# Patient Record
Sex: MALE | Race: WHITE | Employment: OTHER | URBAN - METROPOLITAN AREA
[De-identification: names, ages, dates, MRNs, and addresses within clinical notes are randomized per-mention and may not be internally consistent; named-entity substitution may affect disease eponyms.]

---

## 2017-03-27 PROBLEM — E66.9 OBESITY (BMI 30-39.9): Status: ACTIVE | Noted: 2017-03-27

## 2017-06-12 ENCOUNTER — HOSPITAL ENCOUNTER (OUTPATIENT)
Dept: SURGERY | Age: 77
Discharge: HOME | End: 2017-06-12
Payer: MEDICARE

## 2017-06-12 VITALS
WEIGHT: 304.38 LBS | OXYGEN SATURATION: 98 % | SYSTOLIC BLOOD PRESSURE: 100 MMHG | TEMPERATURE: 97.6 F | HEART RATE: 91 BPM | BODY MASS INDEX: 37.06 KG/M2 | DIASTOLIC BLOOD PRESSURE: 68 MMHG | HEIGHT: 76 IN | RESPIRATION RATE: 18 BRPM

## 2017-06-12 LAB
ANION GAP BLD CALC-SCNC: 6 MMOL/L (ref 7–16)
APPEARANCE UR: CLEAR
BACTERIA SPEC CULT: NORMAL
BILIRUB UR QL: NEGATIVE
BUN SERPL-MCNC: 30 MG/DL (ref 8–23)
CALCIUM SERPL-MCNC: 9.5 MG/DL (ref 8.3–10.4)
CHLORIDE SERPL-SCNC: 107 MMOL/L (ref 98–107)
CO2 SERPL-SCNC: 30 MMOL/L (ref 21–32)
COLOR UR: YELLOW
CREAT SERPL-MCNC: 1.58 MG/DL (ref 0.8–1.5)
ERYTHROCYTE [DISTWIDTH] IN BLOOD BY AUTOMATED COUNT: 15.3 % (ref 11.9–14.6)
GLUCOSE SERPL-MCNC: 111 MG/DL (ref 65–100)
GLUCOSE UR STRIP.AUTO-MCNC: NEGATIVE MG/DL
HCT VFR BLD AUTO: 40.6 % (ref 41.1–50.3)
HGB BLD-MCNC: 13.3 G/DL (ref 13.6–17.2)
HGB UR QL STRIP: NEGATIVE
KETONES UR QL STRIP.AUTO: NEGATIVE MG/DL
LEUKOCYTE ESTERASE UR QL STRIP.AUTO: NEGATIVE
MCH RBC QN AUTO: 28.9 PG (ref 26.1–32.9)
MCHC RBC AUTO-ENTMCNC: 32.8 G/DL (ref 31.4–35)
MCV RBC AUTO: 88.3 FL (ref 79.6–97.8)
NITRITE UR QL STRIP.AUTO: NEGATIVE
PH UR STRIP: 5 [PH] (ref 5–9)
PLATELET # BLD AUTO: 252 K/UL (ref 150–450)
PMV BLD AUTO: 11 FL (ref 10.8–14.1)
POTASSIUM SERPL-SCNC: 4.9 MMOL/L (ref 3.5–5.1)
PROT UR STRIP-MCNC: NEGATIVE MG/DL
RBC # BLD AUTO: 4.6 M/UL (ref 4.23–5.67)
SERVICE CMNT-IMP: NORMAL
SODIUM SERPL-SCNC: 143 MMOL/L (ref 136–145)
SP GR UR REFRACTOMETRY: 1.02 (ref 1–1.02)
UROBILINOGEN UR QL STRIP.AUTO: 0.2 EU/DL (ref 0.2–1)
WBC # BLD AUTO: 8.3 K/UL (ref 4.3–11.1)

## 2017-06-12 PROCEDURE — 93005 ELECTROCARDIOGRAM TRACING: CPT | Performed by: ANESTHESIOLOGY

## 2017-06-12 PROCEDURE — 85027 COMPLETE CBC AUTOMATED: CPT | Performed by: ANESTHESIOLOGY

## 2017-06-12 PROCEDURE — 80048 BASIC METABOLIC PNL TOTAL CA: CPT | Performed by: ANESTHESIOLOGY

## 2017-06-12 PROCEDURE — 87641 MR-STAPH DNA AMP PROBE: CPT | Performed by: ANESTHESIOLOGY

## 2017-06-12 PROCEDURE — 81003 URINALYSIS AUTO W/O SCOPE: CPT | Performed by: ANESTHESIOLOGY

## 2017-06-12 NOTE — PERIOP NOTES
Patient verified name, , and surgery as listed in [facility]. TYPE  CASE:3  Orders per surgeon: ,not Received  Labs per surgeon:CBC,BMP,UA, Mrsa  : results pending  Labs per anesthesia protocol: t/S DOS : results pending  EKG  :  17    Patient provided with handouts including guide to surgery , transfusions, pain management and hand hygiene for the family and community. Pt verbalizes understanding of all pre-op instructions . Instructed that family must be present in building at all times. Nothing to eat or drink after midnight the night prior to surgery. Hibiclens and instructions given per hospital policy. Instructed patient to continue  previous medications as prescribed prior to surgery and  to take the following medications the day of surgery according to anesthesia guidelines :Allopurinol, ASA 81 mg, Finasteride, gabapentin, Tamsulosin          Original medication prescription bottles not visualized during patient appointment. Continue all previous medications unless otherwise directed. Instructed patient to hold  the following medications prior to surgery: Vitamion D, meloxicam, Multivitamin, Fish Oil. Patient verbalized understanding of all instructions and provided all medical/health information to the best of their ability.

## 2017-06-12 NOTE — PERIOP NOTES
Recent Results (from the past 12 hour(s))   CBC W/O DIFF    Collection Time: 06/12/17 10:45 AM   Result Value Ref Range    WBC 8.3 4.3 - 11.1 K/uL    RBC 4.60 4.23 - 5.67 M/uL    HGB 13.3 (L) 13.6 - 17.2 g/dL    HCT 40.6 (L) 41.1 - 50.3 %    MCV 88.3 79.6 - 97.8 FL    MCH 28.9 26.1 - 32.9 PG    MCHC 32.8 31.4 - 35.0 g/dL    RDW 15.3 (H) 11.9 - 14.6 %    PLATELET 542 631 - 968 K/uL    MPV 11.0 10.8 - 69.9 FL   METABOLIC PANEL, BASIC    Collection Time: 06/12/17 10:45 AM   Result Value Ref Range    Sodium 143 136 - 145 mmol/L    Potassium 4.9 3.5 - 5.1 mmol/L    Chloride 107 98 - 107 mmol/L    CO2 30 21 - 32 mmol/L    Anion gap 6 (L) 7 - 16 mmol/L    Glucose 111 (H) 65 - 100 mg/dL    BUN 30 (H) 8 - 23 MG/DL    Creatinine 1.58 (H) 0.8 - 1.5 MG/DL    GFR est AA 55 (L) >60 ml/min/1.73m2    GFR est non-AA 45 (L) >60 ml/min/1.73m2    Calcium 9.5 8.3 - 10.4 MG/DL   URINALYSIS W/ RFLX MICROSCOPIC    Collection Time: 06/12/17 11:00 AM   Result Value Ref Range    Color YELLOW      Appearance CLEAR      Specific gravity 1.020 1.001 - 1.023      pH (UA) 5.0 5.0 - 9.0      Protein NEGATIVE  NEG mg/dL    Glucose NEGATIVE  mg/dL    Ketone NEGATIVE  NEG mg/dL    Bilirubin NEGATIVE  NEG      Blood NEGATIVE  NEG      Urobilinogen 0.2 0.2 - 1.0 EU/dL    Nitrites NEGATIVE  NEG      Leukocyte Esterase NEGATIVE  NEG     MSSA/MRSA SC BY PCR, NASAL SWAB    Collection Time: 06/12/17 11:00 AM   Result Value Ref Range    Special Requests: NO SPECIAL REQUESTS      Culture result:        SA target not detected. A MRSA NEGATIVE, SA NEGATIVE test result does not preclude MRSA or SA nasal colonization.    Reviewed

## 2017-06-13 LAB
ATRIAL RATE: 83 BPM
CALCULATED P AXIS, ECG09: 37 DEGREES
CALCULATED R AXIS, ECG10: 0 DEGREES
CALCULATED T AXIS, ECG11: 24 DEGREES
DIAGNOSIS, 93000: NORMAL
P-R INTERVAL, ECG05: 184 MS
Q-T INTERVAL, ECG07: 370 MS
QRS DURATION, ECG06: 102 MS
QTC CALCULATION (BEZET), ECG08: 434 MS
VENTRICULAR RATE, ECG03: 83 BPM

## 2017-06-19 ENCOUNTER — ANESTHESIA EVENT (OUTPATIENT)
Dept: SURGERY | Age: 77
DRG: 460 | End: 2017-06-19
Payer: MEDICARE

## 2017-06-19 NOTE — H&P
Allergies:NKDA  Medications:Allopurinol (300 MG); Aspirin; Atorvastatin Calcium (10 MG);CoQ10; Finasteride (5 MG); Fish Oil;Gabapentin (600 MG); Lisinopril (10 MG);Meloxicam (7.5 MG); OxyCODONE HCl (5 MG, Take 1 tablet(s) by mouth every 4-6 hours as needed [PRN]);Tamsulosin HCl (0.4 MG)    CC: LOW BACK AND LEG PAIN    HX:  He is a 76-YO gentleman who has had low back pain and left leg pain posterolateral, but really just to the knee, and he has claudication symptoms. He used to be very active and played basketball and walked for exercise, but now with the claudication symptoms progressively worsening he cant be on his feet for any length of time. He has to sit down. I went through his medical history and he is really pretty healthy. 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 He does not have any angina symptoms. He has saw Dr. Eloisa Coleman a year ago and was doing fine. He did see Dr. Alexa De Leon about 2 years ago who did not recommend surgery. He is tired of hurting and having to take medications. The only blood thinner he takes is ASA and Fish Oil. He has no other significant medical problems. He is a large gentleman. He is 64 and weighs 340 lbs. He has previous x-rays from  May 2017 and they show he is straight on the AP view and has good lordosis lateral view. He has a lot of degenerative changes. At L5-S1 he has a spondylolisthesis, but he has large anterior spurs that look like they have autofused. At L4-5 he has a spondylolisthesis; L3-4 no listhesis, but he has spinal stenosis at L3-4 and L4-5 and also a fairly large right sided disc herniation at L3-4. He has a little bit of stenosis at 2-3 above. He really does not have any significant stenosis at 5-1 and even the foramen look pretty free. He has had injections that worked well, but just temporary. He is looking for a surgical solution to increase his activity.  Based on the scan and x-rays and I printed pictures and showed them to him and discussed it. I think he will need L3 to 5 laminectomy and fusion. I think we can ignore the other levels. I think this will makes him a lot better, but not necessarily 100% better. I explained the procedure in detail including the TLIFs and went through the hospital stay average about 3 days, possible rehab, having to wear a brace for 6 weeks, gradually get him back to activities and usually by 3 months patient is doing well. I went through the risks including  death, infection, paralysis, heart attack, stroke, bleeding, transfusion, clot in leg, clot in lung, dural tear, failure of fusion and/or instrumentation and the fact that I  cannot guarantee pain relief. He understands. I answered his questions and he would like to proceed. EXAM:  He is a tall, stocky, but obese gentleman with no gross deformities. He is A & O x 3. HEENT:  PERRLA. Oropharynx is clear. Neck is without adenopathy or bruits. Lungs are clear to auscultation bilaterally. Heart is RRR without murmur. Abdomen is obese, soft and nontender. PLAN:  L3 to 5  laminectomy, fusion TLIF.       Electronically Signed By Jmaa DELEON/antonia

## 2017-06-20 ENCOUNTER — HOSPITAL ENCOUNTER (INPATIENT)
Age: 77
LOS: 3 days | Discharge: HOME | DRG: 460 | End: 2017-06-23
Attending: ORTHOPAEDIC SURGERY | Admitting: ORTHOPAEDIC SURGERY
Payer: MEDICARE

## 2017-06-20 ENCOUNTER — ANESTHESIA (OUTPATIENT)
Dept: SURGERY | Age: 77
DRG: 460 | End: 2017-06-20
Payer: MEDICARE

## 2017-06-20 ENCOUNTER — APPOINTMENT (OUTPATIENT)
Dept: GENERAL RADIOLOGY | Age: 77
DRG: 460 | End: 2017-06-20
Attending: ORTHOPAEDIC SURGERY
Payer: MEDICARE

## 2017-06-20 PROBLEM — M48.062 SPINAL STENOSIS, LUMBAR REGION, WITH NEUROGENIC CLAUDICATION: Status: ACTIVE | Noted: 2017-06-20

## 2017-06-20 PROBLEM — M43.16 SPONDYLOLISTHESIS OF LUMBAR REGION: Status: ACTIVE | Noted: 2017-06-20

## 2017-06-20 LAB
ABO + RH BLD: NORMAL
BLOOD GROUP ANTIBODIES SERPL: NORMAL
SPECIMEN EXP DATE BLD: NORMAL

## 2017-06-20 PROCEDURE — 74011000250 HC RX REV CODE- 250

## 2017-06-20 PROCEDURE — 77030033269 HC SLV COMPR SCD KNE2 CARD -B: Performed by: ORTHOPAEDIC SURGERY

## 2017-06-20 PROCEDURE — 77030008703 HC TU ET UNCUF COVD -A: Performed by: ANESTHESIOLOGY

## 2017-06-20 PROCEDURE — 77030037710: Performed by: ORTHOPAEDIC SURGERY

## 2017-06-20 PROCEDURE — 65270000029 HC RM PRIVATE

## 2017-06-20 PROCEDURE — 77030034849: Performed by: ORTHOPAEDIC SURGERY

## 2017-06-20 PROCEDURE — 74011250636 HC RX REV CODE- 250/636: Performed by: ORTHOPAEDIC SURGERY

## 2017-06-20 PROCEDURE — C1713 ANCHOR/SCREW BN/BN,TIS/BN: HCPCS | Performed by: ORTHOPAEDIC SURGERY

## 2017-06-20 PROCEDURE — 77030037158 HC BIT DRL SPN XIA DISP STRY -C: Performed by: ORTHOPAEDIC SURGERY

## 2017-06-20 PROCEDURE — 0SG30Z1 FUSION OF LUMBOSACRAL JOINT, POST APPR P COL, OPEN APPROACH: ICD-10-PCS | Performed by: ORTHOPAEDIC SURGERY

## 2017-06-20 PROCEDURE — 0SG00AJ FUSION OF LUMBAR VERTEBRAL JOINT WITH INTERBODY FUSION DEVICE, POSTERIOR APPROACH, ANTERIOR COLUMN, OPEN APPROACH: ICD-10-PCS | Performed by: ORTHOPAEDIC SURGERY

## 2017-06-20 PROCEDURE — 86900 BLOOD TYPING SEROLOGIC ABO: CPT | Performed by: ANESTHESIOLOGY

## 2017-06-20 PROCEDURE — 76210000020 HC REC RM PH II FIRST 0.5 HR: Performed by: ORTHOPAEDIC SURGERY

## 2017-06-20 PROCEDURE — 77030014007 HC SPNG HEMSTAT J&J -B: Performed by: ORTHOPAEDIC SURGERY

## 2017-06-20 PROCEDURE — P9045 ALBUMIN (HUMAN), 5%, 250 ML: HCPCS

## 2017-06-20 PROCEDURE — 77030019908 HC STETH ESOPH SIMS -A: Performed by: ANESTHESIOLOGY

## 2017-06-20 PROCEDURE — 77030014368: Performed by: ORTHOPAEDIC SURGERY

## 2017-06-20 PROCEDURE — 77030008477 HC STYL SATN SLP COVD -A: Performed by: ANESTHESIOLOGY

## 2017-06-20 PROCEDURE — 77030012894: Performed by: ORTHOPAEDIC SURGERY

## 2017-06-20 PROCEDURE — 77030014647 HC SEAL FBRN TISSL BAXT -D: Performed by: ORTHOPAEDIC SURGERY

## 2017-06-20 PROCEDURE — 77030018836 HC SOL IRR NACL ICUM -A: Performed by: ORTHOPAEDIC SURGERY

## 2017-06-20 PROCEDURE — 77030019557 HC ELECTRD VES SEAL MEDT -F: Performed by: ORTHOPAEDIC SURGERY

## 2017-06-20 PROCEDURE — 77030037145 HC XCONN SPN POLYX XIA STRY -G: Performed by: ORTHOPAEDIC SURGERY

## 2017-06-20 PROCEDURE — 77030025623 HC BUR RND PRECIS STRY -D: Performed by: ORTHOPAEDIC SURGERY

## 2017-06-20 PROCEDURE — 0SG00Z1 FUSION OF LUM JT, POST APPR P COL, OPEN APPROACH: ICD-10-PCS | Performed by: ORTHOPAEDIC SURGERY

## 2017-06-20 PROCEDURE — 0SG30AJ FUSION OF LUMBOSACRAL JOINT WITH INTERBODY FUSION DEVICE, POSTERIOR APPROACH, ANTERIOR COLUMN, OPEN APPROACH: ICD-10-PCS | Performed by: ORTHOPAEDIC SURGERY

## 2017-06-20 PROCEDURE — 77030000001 HC SPCR SPN PEEK STRY -I2: Performed by: ORTHOPAEDIC SURGERY

## 2017-06-20 PROCEDURE — 77030031139 HC SUT VCRL2 J&J -A: Performed by: ORTHOPAEDIC SURGERY

## 2017-06-20 PROCEDURE — 76060000043 HC ANESTHESIA 6 TO 6.5 HR: Performed by: ORTHOPAEDIC SURGERY

## 2017-06-20 PROCEDURE — 77030020782 HC GWN BAIR PAWS FLX 3M -B: Performed by: ANESTHESIOLOGY

## 2017-06-20 PROCEDURE — 77030034760 HC NDL BN MAR ASPIR JAMSH STRY -B: Performed by: ORTHOPAEDIC SURGERY

## 2017-06-20 PROCEDURE — 74011250636 HC RX REV CODE- 250/636

## 2017-06-20 PROCEDURE — 77030031359 HC BLD BN MILL DISP STRY -E: Performed by: ORTHOPAEDIC SURGERY

## 2017-06-20 PROCEDURE — 77030019940 HC BLNKT HYPOTHRM STRY -B: Performed by: ANESTHESIOLOGY

## 2017-06-20 PROCEDURE — 079T3ZZ DRAINAGE OF BONE MARROW, PERCUTANEOUS APPROACH: ICD-10-PCS | Performed by: ORTHOPAEDIC SURGERY

## 2017-06-20 PROCEDURE — 76010000179 HC OR TIME 6 TO 6.5 HR INTENSV-TIER 1: Performed by: ORTHOPAEDIC SURGERY

## 2017-06-20 PROCEDURE — 88304 TISSUE EXAM BY PATHOLOGIST: CPT | Performed by: ORTHOPAEDIC SURGERY

## 2017-06-20 PROCEDURE — 74011250636 HC RX REV CODE- 250/636: Performed by: ANESTHESIOLOGY

## 2017-06-20 PROCEDURE — 77030011640 HC PAD GRND REM COVD -A: Performed by: ORTHOPAEDIC SURGERY

## 2017-06-20 PROCEDURE — 77030037155 HC BLCKR SPN CAP LOK AXI STRY -B: Performed by: ORTHOPAEDIC SURGERY

## 2017-06-20 PROCEDURE — 74011000272 HC RX REV CODE- 272: Performed by: ORTHOPAEDIC SURGERY

## 2017-06-20 PROCEDURE — 76210000017 HC OR PH I REC 1.5 TO 2 HR: Performed by: ORTHOPAEDIC SURGERY

## 2017-06-20 PROCEDURE — 77030030163 HC BN WAX J&J -A: Performed by: ORTHOPAEDIC SURGERY

## 2017-06-20 PROCEDURE — 77030037143 HC ROD SPN HEX XIA TI STRY -D: Performed by: ORTHOPAEDIC SURGERY

## 2017-06-20 PROCEDURE — 00BT0ZZ EXCISION OF SPINAL MENINGES, OPEN APPROACH: ICD-10-PCS | Performed by: ORTHOPAEDIC SURGERY

## 2017-06-20 PROCEDURE — 72100 X-RAY EXAM L-S SPINE 2/3 VWS: CPT

## 2017-06-20 PROCEDURE — 74011250637 HC RX REV CODE- 250/637: Performed by: ANESTHESIOLOGY

## 2017-06-20 PROCEDURE — 77030002916 HC SUT ETHLN J&J -A: Performed by: ORTHOPAEDIC SURGERY

## 2017-06-20 PROCEDURE — 74011250637 HC RX REV CODE- 250/637: Performed by: ORTHOPAEDIC SURGERY

## 2017-06-20 DEVICE — VITALLIUM PREBENT AND PRECUT ROD WITH HEX
Type: IMPLANTABLE DEVICE | Site: SPINE LUMBAR | Status: FUNCTIONAL
Brand: XIA 4 5

## 2017-06-20 DEVICE — GRAFT BNE SUB 20CC 2 4MM GRAN GROWTH FACT ALLGRFT OSTEOAMP: Type: IMPLANTABLE DEVICE | Site: SPINE LUMBAR | Status: FUNCTIONAL

## 2017-06-20 DEVICE — 30-36 MM POLYAXIAL CROSS CONNECTOR
Type: IMPLANTABLE DEVICE | Site: SPINE LUMBAR | Status: FUNCTIONAL
Brand: XIA 4 5

## 2017-06-20 DEVICE — VERTEBRAL SPACER
Type: IMPLANTABLE DEVICE | Site: SPINE LUMBAR | Status: FUNCTIONAL
Brand: AVS TL

## 2017-06-20 DEVICE — POLYAXIAL CORTICAL SCREW
Type: IMPLANTABLE DEVICE | Site: SPINE LUMBAR | Status: FUNCTIONAL
Brand: XIA 4.5 SYSTEM -  XIA CT

## 2017-06-20 DEVICE — BLOCKER
Type: IMPLANTABLE DEVICE | Site: SPINE LUMBAR | Status: FUNCTIONAL
Brand: XIA 4.5 SYSTEM -  XIA CT

## 2017-06-20 DEVICE — BIO DBM PLUS PUTTY (WITH CANCELLOUS)
Type: IMPLANTABLE DEVICE | Site: SPINE LUMBAR | Status: FUNCTIONAL
Brand: BIO DBM

## 2017-06-20 RX ORDER — CEFAZOLIN SODIUM IN 0.9 % NACL 2 G/50 ML
2 INTRAVENOUS SOLUTION, PIGGYBACK (ML) INTRAVENOUS EVERY 8 HOURS
Status: COMPLETED | OUTPATIENT
Start: 2017-06-20 | End: 2017-06-21

## 2017-06-20 RX ORDER — HYDROMORPHONE HYDROCHLORIDE 1 MG/ML
1 INJECTION, SOLUTION INTRAMUSCULAR; INTRAVENOUS; SUBCUTANEOUS
Status: DISCONTINUED | OUTPATIENT
Start: 2017-06-20 | End: 2017-06-23 | Stop reason: HOSPADM

## 2017-06-20 RX ORDER — ESMOLOL HYDROCHLORIDE 10 MG/ML
INJECTION INTRAVENOUS AS NEEDED
Status: DISCONTINUED | OUTPATIENT
Start: 2017-06-20 | End: 2017-06-20 | Stop reason: HOSPADM

## 2017-06-20 RX ORDER — LISINOPRIL 5 MG/1
10 TABLET ORAL DAILY
Status: DISCONTINUED | OUTPATIENT
Start: 2017-06-21 | End: 2017-06-22

## 2017-06-20 RX ORDER — ONDANSETRON 2 MG/ML
4 INJECTION INTRAMUSCULAR; INTRAVENOUS
Status: DISCONTINUED | OUTPATIENT
Start: 2017-06-20 | End: 2017-06-23 | Stop reason: HOSPADM

## 2017-06-20 RX ORDER — ROCURONIUM BROMIDE 10 MG/ML
INJECTION, SOLUTION INTRAVENOUS AS NEEDED
Status: DISCONTINUED | OUTPATIENT
Start: 2017-06-20 | End: 2017-06-20 | Stop reason: HOSPADM

## 2017-06-20 RX ORDER — GABAPENTIN 300 MG/1
600 CAPSULE ORAL DAILY
Status: DISCONTINUED | OUTPATIENT
Start: 2017-06-21 | End: 2017-06-23 | Stop reason: HOSPADM

## 2017-06-20 RX ORDER — PROPOFOL 10 MG/ML
INJECTION, EMULSION INTRAVENOUS AS NEEDED
Status: DISCONTINUED | OUTPATIENT
Start: 2017-06-20 | End: 2017-06-20 | Stop reason: HOSPADM

## 2017-06-20 RX ORDER — SODIUM CHLORIDE 0.9 % (FLUSH) 0.9 %
5-10 SYRINGE (ML) INJECTION EVERY 8 HOURS
Status: DISCONTINUED | OUTPATIENT
Start: 2017-06-20 | End: 2017-06-23 | Stop reason: HOSPADM

## 2017-06-20 RX ORDER — SODIUM CHLORIDE 0.9 % (FLUSH) 0.9 %
5-10 SYRINGE (ML) INJECTION AS NEEDED
Status: DISCONTINUED | OUTPATIENT
Start: 2017-06-20 | End: 2017-06-20 | Stop reason: HOSPADM

## 2017-06-20 RX ORDER — GLYCOPYRROLATE 0.2 MG/ML
INJECTION INTRAMUSCULAR; INTRAVENOUS AS NEEDED
Status: DISCONTINUED | OUTPATIENT
Start: 2017-06-20 | End: 2017-06-20 | Stop reason: HOSPADM

## 2017-06-20 RX ORDER — ONDANSETRON 2 MG/ML
INJECTION INTRAMUSCULAR; INTRAVENOUS AS NEEDED
Status: DISCONTINUED | OUTPATIENT
Start: 2017-06-20 | End: 2017-06-20 | Stop reason: HOSPADM

## 2017-06-20 RX ORDER — DIPHENHYDRAMINE HCL 25 MG
25 CAPSULE ORAL
Status: DISCONTINUED | OUTPATIENT
Start: 2017-06-20 | End: 2017-06-23 | Stop reason: HOSPADM

## 2017-06-20 RX ORDER — SODIUM CHLORIDE 0.9 G/100ML
IRRIGANT IRRIGATION AS NEEDED
Status: DISCONTINUED | OUTPATIENT
Start: 2017-06-20 | End: 2017-06-20 | Stop reason: HOSPADM

## 2017-06-20 RX ORDER — ALBUMIN HUMAN 50 G/1000ML
SOLUTION INTRAVENOUS AS NEEDED
Status: DISCONTINUED | OUTPATIENT
Start: 2017-06-20 | End: 2017-06-20 | Stop reason: HOSPADM

## 2017-06-20 RX ORDER — TAMSULOSIN HYDROCHLORIDE 0.4 MG/1
0.4 CAPSULE ORAL EVERY EVENING
Status: DISCONTINUED | OUTPATIENT
Start: 2017-06-20 | End: 2017-06-22

## 2017-06-20 RX ORDER — HYDROMORPHONE HYDROCHLORIDE 2 MG/ML
0.5 INJECTION, SOLUTION INTRAMUSCULAR; INTRAVENOUS; SUBCUTANEOUS
Status: DISCONTINUED | OUTPATIENT
Start: 2017-06-20 | End: 2017-06-20 | Stop reason: HOSPADM

## 2017-06-20 RX ORDER — ALLOPURINOL 300 MG/1
300 TABLET ORAL
Status: DISCONTINUED | OUTPATIENT
Start: 2017-06-20 | End: 2017-06-23 | Stop reason: HOSPADM

## 2017-06-20 RX ORDER — SODIUM CHLORIDE, SODIUM LACTATE, POTASSIUM CHLORIDE, CALCIUM CHLORIDE 600; 310; 30; 20 MG/100ML; MG/100ML; MG/100ML; MG/100ML
150 INJECTION, SOLUTION INTRAVENOUS CONTINUOUS
Status: DISCONTINUED | OUTPATIENT
Start: 2017-06-20 | End: 2017-06-20 | Stop reason: HOSPADM

## 2017-06-20 RX ORDER — FINASTERIDE 5 MG/1
5 TABLET, FILM COATED ORAL EVERY EVENING
Status: DISCONTINUED | OUTPATIENT
Start: 2017-06-20 | End: 2017-06-23 | Stop reason: HOSPADM

## 2017-06-20 RX ORDER — LIDOCAINE HYDROCHLORIDE 20 MG/ML
INJECTION, SOLUTION EPIDURAL; INFILTRATION; INTRACAUDAL; PERINEURAL AS NEEDED
Status: DISCONTINUED | OUTPATIENT
Start: 2017-06-20 | End: 2017-06-20 | Stop reason: HOSPADM

## 2017-06-20 RX ORDER — FAMOTIDINE 20 MG/1
20 TABLET, FILM COATED ORAL EVERY 12 HOURS
Status: DISCONTINUED | OUTPATIENT
Start: 2017-06-20 | End: 2017-06-23 | Stop reason: HOSPADM

## 2017-06-20 RX ORDER — ADHESIVE BANDAGE
30 BANDAGE TOPICAL DAILY
Status: DISCONTINUED | OUTPATIENT
Start: 2017-06-21 | End: 2017-06-23 | Stop reason: HOSPADM

## 2017-06-20 RX ORDER — FENTANYL CITRATE 50 UG/ML
100 INJECTION, SOLUTION INTRAMUSCULAR; INTRAVENOUS ONCE
Status: DISCONTINUED | OUTPATIENT
Start: 2017-06-20 | End: 2017-06-20 | Stop reason: HOSPADM

## 2017-06-20 RX ORDER — HYDROCODONE BITARTRATE AND ACETAMINOPHEN 5; 325 MG/1; MG/1
1 TABLET ORAL AS NEEDED
Status: DISCONTINUED | OUTPATIENT
Start: 2017-06-20 | End: 2017-06-20 | Stop reason: HOSPADM

## 2017-06-20 RX ORDER — LIDOCAINE HYDROCHLORIDE 10 MG/ML
0.1 INJECTION INFILTRATION; PERINEURAL AS NEEDED
Status: DISCONTINUED | OUTPATIENT
Start: 2017-06-20 | End: 2017-06-20 | Stop reason: HOSPADM

## 2017-06-20 RX ORDER — SODIUM CHLORIDE 9 MG/ML
50 INJECTION, SOLUTION INTRAVENOUS CONTINUOUS
Status: DISCONTINUED | OUTPATIENT
Start: 2017-06-20 | End: 2017-06-20 | Stop reason: HOSPADM

## 2017-06-20 RX ORDER — MIDAZOLAM HYDROCHLORIDE 1 MG/ML
2 INJECTION, SOLUTION INTRAMUSCULAR; INTRAVENOUS
Status: DISCONTINUED | OUTPATIENT
Start: 2017-06-20 | End: 2017-06-20 | Stop reason: HOSPADM

## 2017-06-20 RX ORDER — SODIUM CHLORIDE 0.9 % (FLUSH) 0.9 %
5-10 SYRINGE (ML) INJECTION EVERY 8 HOURS
Status: DISCONTINUED | OUTPATIENT
Start: 2017-06-20 | End: 2017-06-20 | Stop reason: HOSPADM

## 2017-06-20 RX ORDER — GUAIFENESIN 100 MG/5ML
81 LIQUID (ML) ORAL DAILY
Status: DISCONTINUED | OUTPATIENT
Start: 2017-06-20 | End: 2017-06-23 | Stop reason: HOSPADM

## 2017-06-20 RX ORDER — ACETAMINOPHEN 500 MG
1000 TABLET ORAL
Status: DISCONTINUED | OUTPATIENT
Start: 2017-06-20 | End: 2017-06-20 | Stop reason: HOSPADM

## 2017-06-20 RX ORDER — ATORVASTATIN CALCIUM 10 MG/1
10 TABLET, FILM COATED ORAL
Status: DISCONTINUED | OUTPATIENT
Start: 2017-06-20 | End: 2017-06-23 | Stop reason: HOSPADM

## 2017-06-20 RX ORDER — CYCLOBENZAPRINE HCL 10 MG
10 TABLET ORAL
Status: DISCONTINUED | OUTPATIENT
Start: 2017-06-20 | End: 2017-06-23 | Stop reason: HOSPADM

## 2017-06-20 RX ORDER — HYDROCODONE BITARTRATE AND ACETAMINOPHEN 10; 325 MG/1; MG/1
1 TABLET ORAL
Status: DISCONTINUED | OUTPATIENT
Start: 2017-06-20 | End: 2017-06-23 | Stop reason: HOSPADM

## 2017-06-20 RX ORDER — FENTANYL CITRATE 50 UG/ML
INJECTION, SOLUTION INTRAMUSCULAR; INTRAVENOUS AS NEEDED
Status: DISCONTINUED | OUTPATIENT
Start: 2017-06-20 | End: 2017-06-20 | Stop reason: HOSPADM

## 2017-06-20 RX ORDER — NEOSTIGMINE METHYLSULFATE 1 MG/ML
INJECTION INTRAVENOUS AS NEEDED
Status: DISCONTINUED | OUTPATIENT
Start: 2017-06-20 | End: 2017-06-20 | Stop reason: HOSPADM

## 2017-06-20 RX ORDER — DEXTROSE, SODIUM CHLORIDE, AND POTASSIUM CHLORIDE 5; .45; .15 G/100ML; G/100ML; G/100ML
125 INJECTION INTRAVENOUS CONTINUOUS
Status: DISCONTINUED | OUTPATIENT
Start: 2017-06-20 | End: 2017-06-23 | Stop reason: HOSPADM

## 2017-06-20 RX ORDER — VANCOMYCIN HYDROCHLORIDE 1 G/20ML
INJECTION, POWDER, LYOPHILIZED, FOR SOLUTION INTRAVENOUS AS NEEDED
Status: DISCONTINUED | OUTPATIENT
Start: 2017-06-20 | End: 2017-06-20 | Stop reason: HOSPADM

## 2017-06-20 RX ORDER — FAMOTIDINE 20 MG/1
20 TABLET, FILM COATED ORAL ONCE
Status: COMPLETED | OUTPATIENT
Start: 2017-06-20 | End: 2017-06-20

## 2017-06-20 RX ORDER — SODIUM CHLORIDE 0.9 % (FLUSH) 0.9 %
5-10 SYRINGE (ML) INJECTION AS NEEDED
Status: DISCONTINUED | OUTPATIENT
Start: 2017-06-20 | End: 2017-06-23 | Stop reason: HOSPADM

## 2017-06-20 RX ORDER — MELOXICAM 7.5 MG/1
7.5 TABLET ORAL DAILY
Status: DISCONTINUED | OUTPATIENT
Start: 2017-06-21 | End: 2017-06-22

## 2017-06-20 RX ADMIN — FENTANYL CITRATE 50 MCG: 50 INJECTION, SOLUTION INTRAMUSCULAR; INTRAVENOUS at 07:38

## 2017-06-20 RX ADMIN — ROCURONIUM BROMIDE 10 MG: 10 INJECTION, SOLUTION INTRAVENOUS at 10:15

## 2017-06-20 RX ADMIN — TAMSULOSIN HYDROCHLORIDE 0.4 MG: 0.4 CAPSULE ORAL at 16:33

## 2017-06-20 RX ADMIN — ALBUMIN HUMAN 250 ML: 50 SOLUTION INTRAVENOUS at 10:59

## 2017-06-20 RX ADMIN — CEFAZOLIN 3 G: 1 INJECTION, POWDER, FOR SOLUTION INTRAMUSCULAR; INTRAVENOUS; PARENTERAL at 11:21

## 2017-06-20 RX ADMIN — ASPIRIN 81 MG 81 MG: 81 TABLET ORAL at 06:32

## 2017-06-20 RX ADMIN — Medication 5 ML: at 16:26

## 2017-06-20 RX ADMIN — FAMOTIDINE 20 MG: 20 TABLET ORAL at 20:47

## 2017-06-20 RX ADMIN — HYDROCODONE BITARTRATE AND ACETAMINOPHEN 1 TABLET: 10; 325 TABLET ORAL at 20:47

## 2017-06-20 RX ADMIN — FINASTERIDE 5 MG: 5 TABLET, FILM COATED ORAL at 16:31

## 2017-06-20 RX ADMIN — FENTANYL CITRATE 50 MCG: 50 INJECTION, SOLUTION INTRAMUSCULAR; INTRAVENOUS at 07:36

## 2017-06-20 RX ADMIN — SODIUM CHLORIDE, SODIUM LACTATE, POTASSIUM CHLORIDE, AND CALCIUM CHLORIDE 150 ML/HR: 600; 310; 30; 20 INJECTION, SOLUTION INTRAVENOUS at 06:20

## 2017-06-20 RX ADMIN — ROCURONIUM BROMIDE 5 MG: 10 INJECTION, SOLUTION INTRAVENOUS at 12:15

## 2017-06-20 RX ADMIN — ONDANSETRON 4 MG: 2 INJECTION INTRAMUSCULAR; INTRAVENOUS at 12:55

## 2017-06-20 RX ADMIN — HYDROMORPHONE HYDROCHLORIDE 0.5 MG: 2 INJECTION, SOLUTION INTRAMUSCULAR; INTRAVENOUS; SUBCUTANEOUS at 13:55

## 2017-06-20 RX ADMIN — ROCURONIUM BROMIDE 5 MG: 10 INJECTION, SOLUTION INTRAVENOUS at 12:45

## 2017-06-20 RX ADMIN — NEOSTIGMINE METHYLSULFATE 3 MG: 1 INJECTION INTRAVENOUS at 13:26

## 2017-06-20 RX ADMIN — FENTANYL CITRATE 50 MCG: 50 INJECTION, SOLUTION INTRAMUSCULAR; INTRAVENOUS at 08:20

## 2017-06-20 RX ADMIN — DEXTROSE MONOHYDRATE, SODIUM CHLORIDE, AND POTASSIUM CHLORIDE 125 ML/HR: 50; 4.5; 1.49 INJECTION, SOLUTION INTRAVENOUS at 16:26

## 2017-06-20 RX ADMIN — HYDROCODONE BITARTRATE AND ACETAMINOPHEN 1 TABLET: 5; 325 TABLET ORAL at 14:58

## 2017-06-20 RX ADMIN — SODIUM CHLORIDE, SODIUM LACTATE, POTASSIUM CHLORIDE, AND CALCIUM CHLORIDE: 600; 310; 30; 20 INJECTION, SOLUTION INTRAVENOUS at 08:44

## 2017-06-20 RX ADMIN — ATORVASTATIN CALCIUM 10 MG: 10 TABLET, FILM COATED ORAL at 20:47

## 2017-06-20 RX ADMIN — ALLOPURINOL 300 MG: 300 TABLET ORAL at 20:47

## 2017-06-20 RX ADMIN — FAMOTIDINE 20 MG: 20 TABLET, FILM COATED ORAL at 06:20

## 2017-06-20 RX ADMIN — ROCURONIUM BROMIDE 5 MG: 10 INJECTION, SOLUTION INTRAVENOUS at 11:33

## 2017-06-20 RX ADMIN — SODIUM CHLORIDE, SODIUM LACTATE, POTASSIUM CHLORIDE, AND CALCIUM CHLORIDE: 600; 310; 30; 20 INJECTION, SOLUTION INTRAVENOUS at 10:00

## 2017-06-20 RX ADMIN — ALBUMIN HUMAN 250 ML: 50 SOLUTION INTRAVENOUS at 11:21

## 2017-06-20 RX ADMIN — GLYCOPYRROLATE 0.4 MG: 0.2 INJECTION INTRAMUSCULAR; INTRAVENOUS at 13:26

## 2017-06-20 RX ADMIN — LIDOCAINE HYDROCHLORIDE 100 MG: 20 INJECTION, SOLUTION EPIDURAL; INFILTRATION; INTRACAUDAL; PERINEURAL at 07:38

## 2017-06-20 RX ADMIN — ROCURONIUM BROMIDE 5 MG: 10 INJECTION, SOLUTION INTRAVENOUS at 11:00

## 2017-06-20 RX ADMIN — ROCURONIUM BROMIDE 5 MG: 10 INJECTION, SOLUTION INTRAVENOUS at 12:00

## 2017-06-20 RX ADMIN — FENTANYL CITRATE 25 MCG: 50 INJECTION, SOLUTION INTRAMUSCULAR; INTRAVENOUS at 13:14

## 2017-06-20 RX ADMIN — HYDROMORPHONE HYDROCHLORIDE 0.5 MG: 2 INJECTION, SOLUTION INTRAMUSCULAR; INTRAVENOUS; SUBCUTANEOUS at 14:02

## 2017-06-20 RX ADMIN — ESMOLOL HYDROCHLORIDE 30 MG: 10 INJECTION INTRAVENOUS at 13:32

## 2017-06-20 RX ADMIN — ROCURONIUM BROMIDE 5 MG: 10 INJECTION, SOLUTION INTRAVENOUS at 07:38

## 2017-06-20 RX ADMIN — ROCURONIUM BROMIDE 15 MG: 10 INJECTION, SOLUTION INTRAVENOUS at 09:16

## 2017-06-20 RX ADMIN — FENTANYL CITRATE 50 MCG: 50 INJECTION, SOLUTION INTRAMUSCULAR; INTRAVENOUS at 10:29

## 2017-06-20 RX ADMIN — ROCURONIUM BROMIDE 10 MG: 10 INJECTION, SOLUTION INTRAVENOUS at 08:14

## 2017-06-20 RX ADMIN — ROCURONIUM BROMIDE 5 MG: 10 INJECTION, SOLUTION INTRAVENOUS at 08:45

## 2017-06-20 RX ADMIN — FENTANYL CITRATE 25 MCG: 50 INJECTION, SOLUTION INTRAMUSCULAR; INTRAVENOUS at 13:35

## 2017-06-20 RX ADMIN — SODIUM CHLORIDE, SODIUM LACTATE, POTASSIUM CHLORIDE, AND CALCIUM CHLORIDE: 600; 310; 30; 20 INJECTION, SOLUTION INTRAVENOUS at 12:16

## 2017-06-20 RX ADMIN — CEFAZOLIN 3 G: 1 INJECTION, POWDER, FOR SOLUTION INTRAMUSCULAR; INTRAVENOUS; PARENTERAL at 07:35

## 2017-06-20 RX ADMIN — ROCURONIUM BROMIDE 35 MG: 10 INJECTION, SOLUTION INTRAVENOUS at 07:39

## 2017-06-20 RX ADMIN — PROPOFOL 200 MG: 10 INJECTION, EMULSION INTRAVENOUS at 07:38

## 2017-06-20 RX ADMIN — FENTANYL CITRATE 50 MCG: 50 INJECTION, SOLUTION INTRAMUSCULAR; INTRAVENOUS at 11:11

## 2017-06-20 RX ADMIN — CEFAZOLIN 2 G: 1 INJECTION, POWDER, FOR SOLUTION INTRAMUSCULAR; INTRAVENOUS; PARENTERAL at 20:47

## 2017-06-20 NOTE — PROGRESS NOTES
Primary Nurse Roberto Pena, RN and Hailey Peñaloza RN performed a dual skin assessment on this patient Impairment noted- see wound doc flow sheet, incisional area to mid back, CDI and drain in place patent, draining.   Zack score is 20

## 2017-06-20 NOTE — ANESTHESIA PREPROCEDURE EVALUATION
Anesthetic History   No history of anesthetic complications            Review of Systems / Medical History  Patient summary reviewed, nursing notes reviewed and pertinent labs reviewed    Pulmonary  Within defined limits                 Neuro/Psych   Within defined limits           Cardiovascular    Hypertension: well controlled        Dysrhythmias (h/o aflutter, ok now. )   CAD, CABG (3 vessel, 2010) and hyperlipidemia    Exercise tolerance: <4 METS: Due to left leg and back pain.      GI/Hepatic/Renal         Renal disease: CRI       Endo/Other        Obesity and arthritis (gout)     Other Findings            Physical Exam    Airway  Mallampati: II  TM Distance: 4 - 6 cm  Neck ROM: normal range of motion   Mouth opening: Normal     Cardiovascular    Rhythm: regular  Rate: normal         Dental    Dentition: Upper partial plate and Lower partial plate     Pulmonary            Prolonged expiration     Abdominal         Other Findings            Anesthetic Plan    ASA: 3  Anesthesia type: general - interscalene block          Induction: Intravenous  Anesthetic plan and risks discussed with: Patient

## 2017-06-20 NOTE — PERIOP NOTES
TRANSFER - OUT REPORT:    Verbal report given to  [facility]  on Carrie Price  being transferred to room 704  for routine post - op       Report consisted of patients Situation, Background, Assessment and   Recommendations(SBAR). Information from the following report(s) SBAR, OR Summary, Procedure Summary, Intake/Output, MAR and Cardiac Rhythm SR was reviewed with the receiving nurse. Lines:   Peripheral IV 06/20/17 Right (Active)   Site Assessment Clean, dry, & intact 6/20/2017  2:59 PM   Phlebitis Assessment 0 6/20/2017  2:59 PM   Infiltration Assessment 0 6/20/2017  2:59 PM   Dressing Status Clean, dry, & intact 6/20/2017  2:59 PM   Dressing Type Transparent;Tape 6/20/2017  2:59 PM   Hub Color/Line Status Green 6/20/2017  2:59 PM        Opportunity for questions and clarification was provided. Patient transported with:   O2 @ 2 liters    VTE prophylaxis orders have been written for Carrie Price. Patient and family given floor number and nurses name. Family updated re: pt status after security code verified.

## 2017-06-20 NOTE — PROGRESS NOTES
TRANSFER - IN REPORT:    Verbal report received from VondaRN(name) on [address] [place] [address]  being received from [employer]) for routine post - op      Report consisted of patients Situation, Background, Assessment and   Recommendations(SBAR). Information from the following report(s) SBAR, Kardex, [facility] and Recent Results was reviewed with the receiving nurse. Opportunity for questions and clarification was provided. Assessment completed upon patients arrival to unit and care assumed.

## 2017-06-20 NOTE — ANESTHESIA POSTPROCEDURE EVALUATION
Post-Anesthesia Evaluation and Assessment    Patient: Brayan Holbrook MRN: [id number]  SSN: [id number]    YOB: 1940  Age: 64 y.o. Sex: male       Cardiovascular Function/Vital Signs  Visit Vitals    /72    Pulse 77    Temp 36.2 °C (97.2 °F)    Resp 14    Ht 6' 4\" (1.93 m)    Wt 136.9 kg (301 lb 11.2 oz)    SpO2 98%    BMI 36.72 kg/m2       Patient is status post general anesthesia for Procedure(s):  L3-L5 LAMI FUSION SPINE TRANSFORAMINAL LUMBAR INTERBODY FUSION (TLIF) REMOVAL OF SYNOVIAL CYST L4. Nausea/Vomiting: None    Postoperative hydration reviewed and adequate. Pain:  Pain Scale 1: Numeric (0 - 10) (06/20/17 9593)  Pain Intensity 1: 0 (06/20/17 1715)   Managed    Neurological Status:   Neuro (WDL): Within Defined Limits (06/20/17 0615)   At baseline    Mental Status and Level of Consciousness: Arousable    Pulmonary Status:   O2 Device: Nasal cannula (06/20/17 1345)   Adequate oxygenation and airway patent    Complications related to anesthesia: None    Post-anesthesia assessment completed.  No concerns    Signed By: Sangeeta Alvarado MD     June 20, 2017

## 2017-06-20 NOTE — IP AVS SNAPSHOT
Mai Delarosa 
 
 
 [address] [facility] [address] [facility] 
[phone] Patient: Gely Chaudhari MRN: [id number] :1940 You are allergic to the following No active allergies Recent Documentation Height Weight BMI Smoking Status 1.93 m 136.9 kg 36.72 kg/m2 Never Smoker Emergency Contacts Name Discharge Info Relation Home Work Mobile [facility] DISCHARGE CAREGIVER [3] Son [22] [phone] About your hospitalization You were admitted on:  2017 You last received care in the:  [facility] 7 MED SURG You were discharged on:  2017 Unit phone number:  [phone] Why you were hospitalized Your primary diagnosis was:  Spinal Stenosis Of Lumbar Region Your diagnoses also included:  Spondylolisthesis Of Lumbar Region, Spinal Stenosis, Lumbar Region, With Neurogenic Claudication, Hypotension, Acute Blood Loss Anemia Providers Seen During Your Hospitalizations Provider Role Specialty Primary office phone Ladan Robles MD Attending Provider Orthopedic Surgery [phone] Your Primary Care Physician (PCP) Primary Care Physician Office Phone Office Fax Sun[place] [phone] Follow-up Information Follow up With Details Comments Contact Info Karla Jarvis MD   [address] [facility] [address] 
[phone] Ladan Robles MD On 2017 at [address] [facility] [employer] [facility] [address] 
[phone] Current Discharge Medication List  
  
START taking these medications Dose & Instructions Dispensing Information Comments Morning Noon Evening Bedtime HYDROcodone-acetaminophen  mg tablet Commonly known as:  [place]urt Your next dose is: Today as needed for pain Dose:  1 Tab Take 1 Tab by mouth every six (6) hours as needed. Max Daily Amount: 4 Tabs. Quantity:  60 Tab Refills:  0 CONTINUE these medications which have NOT CHANGED Dose & Instructions Dispensing Information Comments Morning Noon Evening Bedtime  
 allopurinol 300 mg tablet Commonly known as:  Ever [place] Your next dose is: Take tonight 6/23/2017 Dose:  300 mg Take 1 Tab by mouth nightly. Indications: URIC ACID NEPHROPATHY GOUT  
 Quantity:  90 Tab Refills:  3  
     
   
   
   
  
  
 aspirin delayed-release 81 mg tablet Your next dose is:  Tomorrow Morning 6/24/2017 Dose:  81 mg Take 81 mg by mouth daily. Refills:  0  
     
  
   
   
   
  
 atorvastatin 10 mg tablet Commonly known as:  LIPITOR Your next dose is: Take tonight 6/23/2017 Dose:  10 mg Take 1 Tab by mouth nightly. Indications: hypercholestermia Quantity:  90 Tab Refills:  3  
     
   
   
   
  
  
 finasteride 5 mg tablet Commonly known as:  PROSCAR Your next dose is: Take tonight 6/23/2017 Dose:  5 mg Take 1 Tab by mouth every evening. Indications: BENIGN PROSTATIC HYPERPLASIA Quantity:  90 Tab Refills:  3 FISH OIL 1,000 mg Cap Generic drug:  omega-3 fatty acids-vitamin e Your next dose is:  Tomorrow Morning 6/24/2017 Dose:  1 Cap Take 1 Cap by mouth daily. Indications: hold until after surgery Refills:  0  
     
  
   
   
   
  
 gabapentin 600 mg tablet Commonly known as:  NEURONTIN Your next dose is:  Resume home schedule 6/24/2017 Take 1 tablet by mouth  daily Quantity:  90 Tab Refills:  3  
     
   
   
   
  
 lisinopril 10 mg tablet Commonly known as:  Colt Jacobs Your next dose is:  Tomorrow Morning 6/23/2017 Take 1 tab po daily  Indications: hypertension Quantity:  90 Tab Refills:  3  
     
  
   
   
   
  
 meloxicam 7.5 mg tablet Commonly known as:  MOBIC Notes to Patient:  HOLD until follow-up with Dr. Luis Montano Dose:  7.5 mg Take 1 Tab by mouth daily. Quantity:  90 Tab Refills:  3 METANX PO Your next dose is:  Resume home schedule 6/24/2017 Take  by mouth two (2) times a day. Indications: energy supplement Refills:  0  
     
   
   
   
  
 tamsulosin 0.4 mg capsule Commonly known as:  FLOMAX Your next dose is: Take tonight 6/23/2017 Dose:  0.4 mg Take 1 Cap by mouth every evening. Indications: BENIGN PROSTATIC HYPERPLASIA Quantity:  90 Cap Refills:  3 VITAMIN D3 1,000 unit Cap Generic drug:  cholecalciferol Your next dose is:  Tomorrow Morning 6/24/2017 Dose:  2000 Units Take 2,000 Units by mouth every morning. Indications: PREVENTION OF VITAMIN D DEFICIENCY Refills:  0 Where to Get Your Medications Information on where to get these meds will be given to you by the nurse or doctor. ! Ask your nurse or doctor about these medications HYDROcodone-acetaminophen  mg tablet Discharge Instructions Discharge Instructions - Lumbar Fusion Incision Please have someone check your incisions daily. If any of the following should occur, please call the office: 
 Drainage from incision site Opening of incisions Fevers greater than 101 degrees Flu-like symptoms Increased redness and/or tenderness If you have staples or sutures instead of tape on your incision, they may be removed 10-14 days following your surgery. This may be done by a visiting nurse, rehab physician, or at your first follow up visit at our office. Otherwise, if your wound is covered with tape strips, they will typically fall of with showering. Brace If a brace is prescribed, wear it at all times except for sleeping and showering. Always wear a t-shirt under your brace so that it is not directly in contact with your bare skin. The brace may cause you to sweat and you may feel warm. This can irritate your skin so pay special attention to the incision. Showering If your surgeon allows, you may shower as normal once the large, bulky bandages are removed from your incision. If they are not removed before your discharge from the hospital, you may remove them 36-48 hours after your surgery. Hair washing is permissible while in the shower. No tub baths, hot tubs or whirlpools until seen in the office. You may remove your brace for showering. Exercise Lift only objects weighing less than 10-15 lbs. Do not bend or twist at the waist. Always bend with your knees! Limit your sitting to 20-30 minute intervals. You should lie down or walk in between sitting periods. There are no limitations for sitting in a recliner chair. Walk as much as possible and let discomfort. Pain Take pain medicine as prescribed. As your pain level decreases, you may begin to take over-the-counter Extra-Strength Tylenol. Do NOT take any anti-inflammatory (Advil, Aleve, Motrin) medications for 10 weeks after surgery. Do not resume taking Fosamax for eight weeks after your fusion surgery. Driving You may NOT drive a car until told otherwise by your physician. You may be a passenger for short distances (about 20-30 minutes). If you must take a longer trip, be sure to make several pit stops so that you can walk and stretch your legs. Reclining in the passenger seat seems to be the most comfortable position for most patients. Follow-Up Appointments When you are discharged from the hospital, a follow up appointment will be made for 2-3 weeks from your surgery date. Call [phone] to confirm your appointment. If you have additional questions or concerns, call our office ([address]) [phone]. DISCHARGE SUMMARY from Nurse The following personal items are in your possession at time of discharge: 
 
Dental Appliances: Lowers, Partials, Uppers Visual Aid: Glasses Hearing Aids/Status: Does not own Home Medications: None Jewelry: None Clothing: Footwear, Pants, Shirt, Undergarments Other Valuables: Paul Wheat PATIENT INSTRUCTIONS: 
 
 
F-face looks uneven A-arms unable to move or move unevenly S-speech slurred or non-existent T-time-call 911 as soon as signs and symptoms begin-DO NOT go Back to bed or wait to see if you get better-TIME IS BRAIN. Warning Signs of HEART ATTACK Call 911 if you have these symptoms: 
? Chest discomfort. Most heart attacks involve discomfort in the center of the chest that lasts more than a few minutes, or that goes away and comes back. It can feel like uncomfortable pressure, squeezing, fullness, or pain. ? Discomfort in other areas of the upper body. Symptoms can include pain or discomfort in one or both arms, the back, neck, jaw, or stomach. ? Shortness of breath with or without chest discomfort. ? Other signs may include breaking out in a cold sweat, nausea, or lightheadedness. Don't wait more than five minutes to call [address]! Fast action can save your life. Calling 911 is almost always the fastest way to get lifesaving treatment. Emergency Medical Services staff can begin treatment when they arrive  up to an hour sooner than if someone gets to the hospital by car. The discharge information has been reviewed with the patient. The patient verbalized understanding. Discharge medications reviewed with the patient and appropriate educational materials and side effects teaching were provided. Discharge Orders None [facility] Announcement We are excited to announce that we are making your provider's discharge notes available to you in [employer]. You will see these notes when they are completed and signed by the physician that discharged you from your recent hospital stay. If you have any questions or concerns about any information you see in [employer], please call the Health Information Department where you were seen or reach out to your Primary Care Provider for more information about your plan of care. Introducing [facility]! [employer] introduces [employer] patient portal. Now you can access parts of your medical record, email your doctor's office, and request medication refills online. 1. In your internet browser, go to [email or web address] 2. Click on the First Time User? Click Here link in the Sign In box. You will see the New Member Sign Up page. 3. Enter your H[employer] Access Code exactly as it appears below. You will not need to use this code after youve completed the sign-up process. If you do not sign up before the expiration date, you must request a new code. · H[employer] Access Code: [email or web address] Expires: 9/20/2017  5:52 PM 
 
4. Enter the last four digits of your Social Security Number (xxxx) and Date of Birth (mm/dd/yyyy) as indicated and click Submit. You will be taken to the next sign-up page. 5. Create a H[employer] 3[employer] ID. This will be your [employer] login ID and cannot be changed, so think of one that is secure and easy to remember. 6. Create a [employer] password. You can change your password at any time. 7. Enter your Password Reset Question and Answer. This can be used at a later time if you forget your password. 8. Enter your e-mail address. You will receive e-mail notification when new information is available in [address] 9. Click Sign Up. You can now view and download portions of your medical record.  
10. Click the Download Summary menu link to download a portable copy of your medical information. If you have questions, please visit the Frequently Asked Questions section of the [employer] website. Remember, MyChart is NOT to be used for urgent needs. For medical emergencies, dial 911. Now available from your iPhone and Android! General Information Please provide this summary of care documentation to your next provider. Patient Signature:  ____________________________________________________________ Date:  ____________________________________________________________  
  
Alison Brought Provider Signature:  ____________________________________________________________ Date:  ____________________________________________________________

## 2017-06-20 NOTE — BRIEF OP NOTE
BRIEF OPERATIVE NOTE    Date of Procedure: 6/20/2017   Preoperative Diagnosis: Lumbar stenosis [M48.06]  Spondylolisthesis of lumbar region [M43.16]  Postoperative Diagnosis: Lumbar stenosis [M48.06]  Spondylolisthesis of lumbar region [M43.16]    Procedure(s):  L3-L5 LAMI FUSION SPINE TRANSFORAMINAL LUMBAR INTERBODY FUSION (TLIF) REMOVAL OF SYNOVIAL CYST L4  Surgeon(s) and Role:     * David Hall MD - Primary         Assistant Staff:  Physician Assistant: ROLAN Silva    Surgical Staff:  Circ-1: Soto Martinez RN  Circ-Relief: Kenzie Shankar RN; Morenita Boyd RN  Physician Assistant: ROLAN Silva  Radiology Technician: Desmond Puentes RT, R, CT  Scrub Tech-1: Omaira Chadwick  Scrub Tech-Relief: Paolo Walker  Event Time In   Incision Start 7843   Incision Close 1323     Anesthesia: General   Estimated Blood Loss: 1200cc  Specimens:   ID Type Source Tests Collected by Time Destination   1 : L4 synovial cyst Preservative Spine  David Hall MD 6/20/2017 1045 Pathology      Findings: stenosis   Complications:none  Implants:   Implant Name Type Inv.  Item Serial No.  Lot No. LRB No. Used Action   GRAFT BNE GRAN DBM 2-4MM 20ML -- OSTEOAMP - [id number]  GRAFT BNE GRAN DBM 2-4MM 20ML -- OSTEOAMP [id number]--[id number] [employer]  N/A 1 Implanted   GRAFT DBM PTTY+ W/CHIP 10ML -- BIO DBM - [id number]  GRAFT DBM PTTY+ W/CHIP 10ML -- BIO DBM  RADAMES SPINE HOWM [id number] N/A 1 Implanted   GRAFT DBM PTTY+ W/CHIP 10ML -- BIO DBM - [id number]  GRAFT DBM PTTY+ W/CHIP 10ML -- BIO DBM  RADAMES SPINE HOWM [id number] N/A 1 Implanted   SPACER SPNE VERT AVS 4D 11X30 --  - [id number]  SPACER SPNE VERT AVS 4D 11X30 --   RADAMES SPINE HOWM [id number] N/A 2 Implanted   BLOCKER SPNE CAP LCK -- KAROLYN 4.5 CT - [id number]  BLOCKER SPNE CAP LCK -- KAROLYN 4.5 CT  RADAMES SPINE HOWM [id number] N/A 6 Implanted   SCR BNE DEBO POLYAXL 5.5X40MM -- KAROLYN 4.5 CT - [id number]  SCR BNE DEBO POLYAXL 5.5X40MM -- KAROLYN 4.5 CT  [facility] SPINE HOWM [id number] N/A 6 Implanted   DIMAS SPNE W/HEX 4.5X60MM VIT -- KAROLYN 4.5 - [id number]  DIMAS SPNE [place] Rings 4.5X60MM VIT -- KAROLYN 4.5  RADAMES SPINE HOWM [id number] N/A 1 Implanted   DIMAS SPNE W/HEX 4.5X70MM VIT -- KAROLYN 4.5 - [id number]  DIMAS SPNE W/HEX 4.5X70MM VIT -- KAROLYN 4.5  RADAMES SPINE HOWM [id number] N/A 1 Implanted   CONN CROSS SPNE POLYAXL MED -- KAROLYN 4.5 - [id number]   CONN CROSS SPNE POLYAXL MED -- KAROLYN 4.5   RADAMES SPINE HOWM [id number] N/A 1 Implanted

## 2017-06-20 NOTE — PROGRESS NOTES
's Pre-op visit requested by patient. Conveyed care and concern for patient and family. Offered prayer as requested for patient, family, and staff.     Jannie Candelario MDiv, BS  Board Certified

## 2017-06-21 PROBLEM — I95.9 HYPOTENSION: Status: ACTIVE | Noted: 2017-06-21

## 2017-06-21 PROBLEM — D62 ACUTE BLOOD LOSS ANEMIA: Status: ACTIVE | Noted: 2017-06-21

## 2017-06-21 LAB
ALBUMIN SERPL BCP-MCNC: 2.7 G/DL (ref 3.2–4.6)
ALBUMIN/GLOB SERPL: 0.8 {RATIO} (ref 1.2–3.5)
ALP SERPL-CCNC: 54 U/L (ref 50–136)
ALT SERPL-CCNC: 12 U/L (ref 12–65)
ANION GAP BLD CALC-SCNC: 8 MMOL/L (ref 7–16)
AST SERPL W P-5'-P-CCNC: 27 U/L (ref 15–37)
BILIRUB SERPL-MCNC: 0.6 MG/DL (ref 0.2–1.1)
BUN SERPL-MCNC: 25 MG/DL (ref 8–23)
CALCIUM SERPL-MCNC: 8.4 MG/DL (ref 8.3–10.4)
CHLORIDE SERPL-SCNC: 104 MMOL/L (ref 98–107)
CO2 SERPL-SCNC: 27 MMOL/L (ref 21–32)
CREAT SERPL-MCNC: 1.65 MG/DL (ref 0.8–1.5)
ERYTHROCYTE [DISTWIDTH] IN BLOOD BY AUTOMATED COUNT: 15.5 % (ref 11.9–14.6)
ERYTHROCYTE [DISTWIDTH] IN BLOOD BY AUTOMATED COUNT: 15.6 % (ref 11.9–14.6)
GLOBULIN SER CALC-MCNC: 3.2 G/DL (ref 2.3–3.5)
GLUCOSE SERPL-MCNC: 106 MG/DL (ref 65–100)
HCT VFR BLD AUTO: 29.7 % (ref 41.1–50.3)
HCT VFR BLD AUTO: 31.4 % (ref 41.1–50.3)
HGB BLD-MCNC: 10.2 G/DL (ref 13.6–17.2)
HGB BLD-MCNC: 9.6 G/DL (ref 13.6–17.2)
MAGNESIUM SERPL-MCNC: 2.4 MG/DL (ref 1.8–2.4)
MCH RBC QN AUTO: 28.7 PG (ref 26.1–32.9)
MCH RBC QN AUTO: 28.7 PG (ref 26.1–32.9)
MCHC RBC AUTO-ENTMCNC: 32.3 G/DL (ref 31.4–35)
MCHC RBC AUTO-ENTMCNC: 32.5 G/DL (ref 31.4–35)
MCV RBC AUTO: 88.2 FL (ref 79.6–97.8)
MCV RBC AUTO: 88.9 FL (ref 79.6–97.8)
PLATELET # BLD AUTO: 205 K/UL (ref 150–450)
PLATELET # BLD AUTO: 208 K/UL (ref 150–450)
PMV BLD AUTO: 10.5 FL (ref 10.8–14.1)
PMV BLD AUTO: 10.8 FL (ref 10.8–14.1)
POTASSIUM SERPL-SCNC: 4.5 MMOL/L (ref 3.5–5.1)
PROT SERPL-MCNC: 5.9 G/DL (ref 6.3–8.2)
RBC # BLD AUTO: 3.34 M/UL (ref 4.23–5.67)
RBC # BLD AUTO: 3.56 M/UL (ref 4.23–5.67)
SODIUM SERPL-SCNC: 139 MMOL/L (ref 136–145)
TSH SERPL DL<=0.005 MIU/L-ACNC: 0.21 UIU/ML (ref 0.36–3.74)
WBC # BLD AUTO: 10.7 K/UL (ref 4.3–11.1)
WBC # BLD AUTO: 11.6 K/UL (ref 4.3–11.1)

## 2017-06-21 PROCEDURE — 97162 PT EVAL MOD COMPLEX 30 MIN: CPT

## 2017-06-21 PROCEDURE — 74011250637 HC RX REV CODE- 250/637: Performed by: ORTHOPAEDIC SURGERY

## 2017-06-21 PROCEDURE — 84443 ASSAY THYROID STIM HORMONE: CPT | Performed by: HOSPITALIST

## 2017-06-21 PROCEDURE — 80053 COMPREHEN METABOLIC PANEL: CPT | Performed by: HOSPITALIST

## 2017-06-21 PROCEDURE — 36415 COLL VENOUS BLD VENIPUNCTURE: CPT | Performed by: ORTHOPAEDIC SURGERY

## 2017-06-21 PROCEDURE — 83735 ASSAY OF MAGNESIUM: CPT | Performed by: HOSPITALIST

## 2017-06-21 PROCEDURE — 74011250636 HC RX REV CODE- 250/636: Performed by: ORTHOPAEDIC SURGERY

## 2017-06-21 PROCEDURE — 77010033678 HC OXYGEN DAILY

## 2017-06-21 PROCEDURE — 74011250637 HC RX REV CODE- 250/637: Performed by: ANESTHESIOLOGY

## 2017-06-21 PROCEDURE — 94760 N-INVAS EAR/PLS OXIMETRY 1: CPT

## 2017-06-21 PROCEDURE — 65270000029 HC RM PRIVATE

## 2017-06-21 PROCEDURE — 85027 COMPLETE CBC AUTOMATED: CPT | Performed by: ORTHOPAEDIC SURGERY

## 2017-06-21 RX ORDER — SODIUM CHLORIDE 9 MG/ML
100 INJECTION, SOLUTION INTRAVENOUS CONTINUOUS
Status: DISCONTINUED | OUTPATIENT
Start: 2017-06-22 | End: 2017-06-23 | Stop reason: HOSPADM

## 2017-06-21 RX ORDER — LANOLIN ALCOHOL/MO/W.PET/CERES
1 CREAM (GRAM) TOPICAL
Status: DISCONTINUED | OUTPATIENT
Start: 2017-06-22 | End: 2017-06-23 | Stop reason: HOSPADM

## 2017-06-21 RX ORDER — ASCORBIC ACID 500 MG
250 TABLET ORAL DAILY
Status: DISCONTINUED | OUTPATIENT
Start: 2017-06-22 | End: 2017-06-23 | Stop reason: HOSPADM

## 2017-06-21 RX ADMIN — LISINOPRIL 10 MG: 5 TABLET ORAL at 09:36

## 2017-06-21 RX ADMIN — MELOXICAM 7.5 MG: 7.5 TABLET ORAL at 09:38

## 2017-06-21 RX ADMIN — Medication 10 ML: at 22:51

## 2017-06-21 RX ADMIN — CEFAZOLIN 2 G: 1 INJECTION, POWDER, FOR SOLUTION INTRAMUSCULAR; INTRAVENOUS; PARENTERAL at 05:07

## 2017-06-21 RX ADMIN — FAMOTIDINE 20 MG: 20 TABLET ORAL at 09:38

## 2017-06-21 RX ADMIN — ATORVASTATIN CALCIUM 10 MG: 10 TABLET, FILM COATED ORAL at 22:51

## 2017-06-21 RX ADMIN — HYDROCODONE BITARTRATE AND ACETAMINOPHEN 1 TABLET: 10; 325 TABLET ORAL at 05:07

## 2017-06-21 RX ADMIN — DIPHENHYDRAMINE HYDROCHLORIDE 25 MG: 25 CAPSULE ORAL at 01:36

## 2017-06-21 RX ADMIN — ASPIRIN 81 MG 81 MG: 81 TABLET ORAL at 09:38

## 2017-06-21 RX ADMIN — DEXTROSE MONOHYDRATE, SODIUM CHLORIDE, AND POTASSIUM CHLORIDE 125 ML/HR: 50; 4.5; 1.49 INJECTION, SOLUTION INTRAVENOUS at 09:45

## 2017-06-21 RX ADMIN — CEFAZOLIN 2 G: 1 INJECTION, POWDER, FOR SOLUTION INTRAMUSCULAR; INTRAVENOUS; PARENTERAL at 13:19

## 2017-06-21 RX ADMIN — HYDROCODONE BITARTRATE AND ACETAMINOPHEN 1 TABLET: 10; 325 TABLET ORAL at 15:27

## 2017-06-21 RX ADMIN — GABAPENTIN 600 MG: 300 CAPSULE ORAL at 09:38

## 2017-06-21 RX ADMIN — MAGNESIUM HYDROXIDE 30 ML: 400 SUSPENSION ORAL at 09:36

## 2017-06-21 RX ADMIN — FINASTERIDE 5 MG: 5 TABLET, FILM COATED ORAL at 17:32

## 2017-06-21 RX ADMIN — HYDROCODONE BITARTRATE AND ACETAMINOPHEN 1 TABLET: 10; 325 TABLET ORAL at 09:42

## 2017-06-21 RX ADMIN — FAMOTIDINE 20 MG: 20 TABLET ORAL at 22:51

## 2017-06-21 RX ADMIN — ALLOPURINOL 300 MG: 300 TABLET ORAL at 22:51

## 2017-06-21 RX ADMIN — CYCLOBENZAPRINE HYDROCHLORIDE 10 MG: 10 TABLET, FILM COATED ORAL at 01:36

## 2017-06-21 RX ADMIN — TAMSULOSIN HYDROCHLORIDE 0.4 MG: 0.4 CAPSULE ORAL at 18:00

## 2017-06-21 RX ADMIN — Medication 5 ML: at 13:19

## 2017-06-21 NOTE — PROGRESS NOTES
PT Note:  Pt's chart reviewed and treatment attempted this PM.  RN requested PT hold treatment this afternoon due to pt being hypotensive. Will re-attempt PT pending schedule and pt status.   Thanks,  Bonita Lau, PT, DPT

## 2017-06-21 NOTE — PROGRESS NOTES
Spoke with Dr. Pau Sanchez, on call. Patient is hypotensive BP 83/54 , 02 sat 92% 2L NC, hemovac output 310 ml serosanguineous, continuous fluid at 125ml/hr, poor urine out put, bladder scanned 134 ml of urine, Patient is not symptomatic, states he feels fine, and no discomfort. Per MD, consult hospitalist for BP management. Will continue to monitor patient.

## 2017-06-21 NOTE — PROGRESS NOTES
Problem: Mobility Impaired (Adult and Pediatric)  Goal: *Acute Goals and Plan of Care (Insert Text)  LTG:  (1.)Mr. Black will move from supine to sit and sit to supine and roll side to side, using log roll technique, with MODIFIED INDEPENDENCE within 7 day(s). (2.)Mr. Black will transfer from bed to chair and chair to bed with MODIFIED INDEPENDENCE using the least restrictive device within 7 day(s). (3.)Mr. Black will ambulate with MODIFIED INDEPENDENCE for 500 feet with the least restrictive device within 7 day(s). (4.)Mr. Black will verbalize 3/3 post-op spinal precautions with INDEPENDENCE within 7 day(s). (5.)Mr. Black will ascend and descend 5 stairs using B hand rail(s) with SUPERVISION to improve functional mobility and safety within 7 day(s). ________________________________________________________________________________________________      PHYSICAL THERAPY: INITIAL ASSESSMENT, AM 6/21/2017  INPATIENT: Hospital Day: 2  Payor: CARE IMPROVEMENT PLUS [place] / Product Type: Managed Care Medicare /    Spinal precautions and brace     NAME/AGE/GENDER: Alvaro Das is a 64 y.o. male      PRIMARY DIAGNOSIS: Lumbar stenosis [M48.06]  Spondylolisthesis of lumbar region [M43.16] Spinal stenosis of lumbar region Spinal stenosis of lumbar region  Procedure(s) (LRB):  L3-L5 LAMI FUSION SPINE TRANSFORAMINAL LUMBAR INTERBODY FUSION (TLIF) REMOVAL OF SYNOVIAL CYST L4 (N/A)  1 Day Post-Op  ICD-10: Treatment Diagnosis:       · Generalized Muscle Weakness (M62.81)  · Difficulty in walking, Not elsewhere classified (R26.2)  · Other abnormalities of gait and mobility (R26.89)   Precaution/Allergies:  Review of patient's allergies indicates no known allergies. ASSESSMENT:      Mr. Sunita Huerta is a 64 y.o. male s/p above who was supine in bed on nasal cannula upon arrival.  Pt reports that he lives with his wife in a one story house with 4-5 steps to enter.   Pt also stated that he was independent with both ADLs and ambulation PTA. Mr. Stephanie Bustamante said that he left his spinal brace at home and son reports that he will have it for afternoon session. Mr. Stephanie Bustamante was given education on spinal precautions as well as log roll technique. His supplemental O2 was removed with SpO2 recorded at 91% on room air and HR of 120 at rest.  Due to lower SpO2 pt's NC reapplied for remainder of session. Pt presents to PT with weakness and decreased AROM in R hip flexors (3-/5). He states that he has tingling in R LE and hand (IV site) but reported intact and equal sensation to light touch in B L3-S2 dermatomes. Pt required min assist for bed mobility and has fair dynamic sitting balance. He was able to stand and transfer to bedside chair with min assist as well and fair to poor standing balance. Pt could benefit from skilled PT as he is functioning below baseline. This section established at most recent assessment   PROBLEM LIST (Impairments causing functional limitations):  1. Decreased Strength  2. Decreased Transfer Abilities  3. Decreased Ambulation Ability/Technique  4. Decreased Balance  5. Decreased Activity Tolerance    INTERVENTIONS PLANNED: (Benefits and precautions of physical therapy have been discussed with the patient.)  1. Balance Exercise  2. Bed Mobility  3. Family Education  4. Gait Training  5. Neuromuscular Re-education/Strengthening  6. Therapeutic Activites  7. Therapeutic Exercise/Strengthening  8. Transfer Training  9. Group Therapy      TREATMENT PLAN: Frequency/Duration: twice daily for duration of hospital stay  Rehabilitation Potential For Stated Goals: GOOD      RECOMMENDED REHABILITATION/EQUIPMENT: (at time of discharge pending progress): Continue Skilled Therapy.                    HISTORY:   History of Present Injury/Illness (Reason for Referral):  S/P L3-L5 LAMI FUSION SPINE TRANSFORAMINAL LUMBAR INTERBODY FUSION (TLIF) REMOVAL OF SYNOVIAL CYST L4 (N/A)  Past Medical History/Comorbidities:   Mr. Nathaniel Corral  has a past medical history of Back problem; Chronic pain; Chronic systolic heart failure ([facility] [address] Circulation problem; Colon polyps; Coronary artery disease (08/2010); Gout; Hyperlipidemia; Hypertension; Obesity (BMI 30-39.9); Pain in joint, multiple sites ( ); rheumatoid Arthritis; S/P CABG x 3; and SVT (supraventricular tachycardia) (HCC) ( ). He also has no past medical history of Difficult intubation; Malignant hyperthermia due to anesthesia; Nausea & vomiting; Pseudocholinesterase deficiency; or Unspecified adverse effect of anesthesia. Mr. Nathaniel Corral  has a past surgical history that includes colonoscopy; coronary artery bypass graft (2012); cyst removal; orthopaedic; knee arthroscopy (Bilateral); cardiac surg procedure unlist (08/2010); cataract removal (Left, 11/15/2016); and cataract removal (Right, 11/25/2016). Social History/Living Environment:   Home Environment: Private residence  # Steps to Enter: 5  One/Two Story Residence: One story  Living Alone: No  Support Systems: Spouse/Significant Other/Partner  Patient Expects to be Discharged to[de-identified] Private residence  Current DME Used/Available at Home: [address], [place], straight, Walker, rolling  Tub or Shower Type: Shower  Prior Level of Function/Work/Activity:  See above      Number of Personal Factors/Comorbidities that affect the Plan of Care:  CHF  Gout  RA  Chronic pain    3+: HIGH COMPLEXITY   EXAMINATION:   Most Recent Physical Functioning:   Gross Assessment:  AROM: Generally decreased, functional (R hip flexion)  Strength: Generally decreased, functional (R hip flexion (3-/5))  Sensation: Intact (B L3-S2)               Posture:     Balance:  Sitting: Impaired  Sitting - Static: Good (unsupported)  Sitting - Dynamic: Fair (occasional)  Standing: Impaired  Standing - Static: Fair  Standing - Dynamic : Poor Bed Mobility:  Rolling: Minimum assistance  Supine to Sit: Minimum assistance  Interventions: Manual cues; Safety awareness training;Verbal cues; Visual cues  Wheelchair Mobility:     Transfers:  Sit to Stand: Minimum assistance  Stand to Sit: Contact guard assistance  Bed to Chair: Minimum assistance  Interventions: Safety awareness training;Verbal cues; Visual cues;Manual cues  Gait:             Body Structures Involved:  1. Nerves  2. Heart  3. Lungs  4. Bones  5. Muscles Body Functions Affected:  1. Sensory/Pain  2. Cardio  3. Respiratory  4. Neuromusculoskeletal  5. Movement Related Activities and Participation Affected:  1. General Tasks and Demands  2. Mobility  3. Domestic Life  4. Community, Social and [place]   Number of elements that affect the Plan of Care: 4+: HIGH COMPLEXITY   CLINICAL PRESENTATION:   Presentation: Evolving clinical presentation with changing clinical characteristics: MODERATE COMPLEXITY   CLINICAL DECISION MAKIN [facility] Inpatient Short Form  How much difficulty does the patient currently have. .. Unable A Lot A Little None   1. Turning over in bed (including adjusting bedclothes, sheets and blankets)? [ ] 1   [ ] 2   [X] 3   [ ] 4   2. Sitting down on and standing up from a chair with arms ( e.g., wheelchair, bedside commode, etc.)   [ ] 1   [ ] 2   [X] 3   [ ] 4   3. Moving from lying on back to sitting on the side of the bed? [ ] 1   [ ] 2   [X] 3   [ ] 4   How much help from another person does the patient currently need. .. Total A Lot A Little None   4. Moving to and from a bed to a chair (including a wheelchair)? [ ] 1   [ ] 2   [X] 3   [ ] 4   5. Need to walk in hospital room? [ ] 1   [X] 2   [ ] 3   [ ] 4   6. Climbing 3-5 steps with a railing? [ ] 1   [X] 2   [ ] 3   [ ] 4   © 2007, Trustees of [employer], under license to [employer].  All rights reserved    Score:  Initial: 16 Most Recent: X (Date: -- )     Interpretation of Tool:  Represents activities that are increasingly more difficult (i.e. Bed mobility, Transfers, Gait).       Score 24 23 22-20 19-15 14-10 9-7 6       Modifier CH CI CJ CK CL CM CN         · Mobility - Walking and Moving Around:               - CURRENT STATUS:    CK - 40%-59% impaired, limited or restricted               - GOAL STATUS:           CJ - 20%-39% impaired, limited or restricted               - D/C STATUS:                       ---------------To be determined---------------  Payor: CARE IMPROVEMENT PLUS [place] / Product Type: [employer] Medicare /       Medical Necessity:     · Patient demonstrates good rehab potential due to higher previous functional level. Reason for Services/Other Comments:  · Patient continues to require skilled intervention due to decreased balance, functional mobility, and activity tolerance. Use of outcome tool(s) and clinical judgement create a POC that gives a: Questionable prediction of patient's progress: MODERATE COMPLEXITY                 TREATMENT:   (In addition to Assessment/Re-Assessment sessions the following treatments were rendered)   Pre-treatment Symptoms/Complaints:  Back pain; tachycardic at rest  Pain: Initial:   Pain Intensity 1: 3  Pain Location 1: Back  Pain Orientation 1: Lower  Post Session:  3/10      Assessment/Reassessment only, no treatment provided today     Braces/Orthotics/Lines/Etc:   · IV  · drain (hemovac)  · O2 Device: Nasal cannula  Treatment/Session Assessment:    · Response to Treatment:  Pt tolerated fairly given his tachycardia at rest and decreased SpO2 on room air. · Interdisciplinary Collaboration:  · Physical Therapist  · Registered Nurse  · Certified Nursing Assistant/Patient Care Technician  · After treatment position/precautions:  · Up in chair  · Call light within reach  · RN notified  · Family at bedside  · Compliance with Program/Exercises: Will assess as treatment progresses. · Recommendations/Intent for next treatment session:   \"Next visit will focus on advancements to more challenging activities and reduction in assistance provided\".   Total Treatment Duration:  PT Patient Time In/Time Out  Time In: 0904  Time Out: Krys. Damon Salcedo [address] Nigel, PT, DPT

## 2017-06-21 NOTE — PROGRESS NOTES
ORTHO PROGRESS NOTE    2017    Admit Date: 2017  Admit Diagnosis: Lumbar stenosis [M48.06]  Spondylolisthesis of lumbar region [M43.16]  Post Op day: 1 Day Post-Op      Subjective:     Maurice Mcbride is a patient who is now 1 Day Post-Op  and has no complaints. Objective:     PT/OT: to progress today       Vital Signs:    Patient Vitals for the past 8 hrs:   BP Temp Pulse Resp SpO2   17 0741 121/66 98.8 °F (37.1 °C) (!) 121 18 92 %   17 0355 132/78 98.1 °F (36.7 °C) (!) 106 18 94 %     Temp (24hrs), Av.9 °F (36.6 °C), Min:97 °F (36.1 °C), Max:98.8 °F (37.1 °C)      LAB:    Recent Labs      17   0710   HGB  10.2*   WBC  10.7   PLT  205       I/O:      1901 -  0700  In: 3500 [I.V.:3500]  Out: 6138 [Urine:1950; Drains:630]    Physical Exam:    Awake and in no acute distress. Mood and affect appropriate. Respirations unlabored and no evidence cyanosis. Calves nontender. Abdomen soft and nontender. Dressing clean/dry  No new neurologic deficit. Assessment:      Patient Active Problem List   Diagnosis Code    Coronary artery disease I25.10    History of coronary artery bypass graft x 3 Z95.1    Hypercholesterolemia E78.00    BPH (benign prostatic hypertrophy) N40.0    Neuropathy, peripheral (HCC) G62.9    Gout M10.9    Hypertension I10    Rheumatoid arthritis of hand ([facility] [address] M06.9    SVT (supraventricular tachycardia) ([facility]) I47.1    Spinal stenosis of lumbar region M48.06    Obesity (BMI 30-39. 9) E66.9    Spondylolisthesis of lumbar region M43.16    Spinal stenosis, lumbar region, with neurogenic claudication M48.06       1 Day Post-Op STATUS POST Procedure(s):  L3-L5 LAMI FUSION SPINE TRANSFORAMINAL LUMBAR INTERBODY FUSION (TLIF) REMOVAL OF SYNOVIAL CYST L4      Plan:     Continue PT/OT/Rehab  Discontinue: main  Consult: none  Anticipate discharge to: HOME      Signed By: Alok Ch NP

## 2017-06-22 ENCOUNTER — APPOINTMENT (OUTPATIENT)
Dept: GENERAL RADIOLOGY | Age: 77
DRG: 460 | End: 2017-06-22
Attending: HOSPITALIST
Payer: MEDICARE

## 2017-06-22 LAB
ANION GAP BLD CALC-SCNC: 9 MMOL/L (ref 7–16)
ATRIAL RATE: 96 BPM
BUN SERPL-MCNC: 24 MG/DL (ref 8–23)
CALCIUM SERPL-MCNC: 8.6 MG/DL (ref 8.3–10.4)
CALCULATED P AXIS, ECG09: 33 DEGREES
CALCULATED R AXIS, ECG10: -8 DEGREES
CALCULATED T AXIS, ECG11: 3 DEGREES
CHLORIDE SERPL-SCNC: 105 MMOL/L (ref 98–107)
CO2 SERPL-SCNC: 25 MMOL/L (ref 21–32)
CREAT SERPL-MCNC: 1.49 MG/DL (ref 0.8–1.5)
DIAGNOSIS, 93000: NORMAL
ERYTHROCYTE [DISTWIDTH] IN BLOOD BY AUTOMATED COUNT: 15.5 % (ref 11.9–14.6)
ERYTHROCYTE [DISTWIDTH] IN BLOOD BY AUTOMATED COUNT: 15.6 % (ref 11.9–14.6)
GLUCOSE SERPL-MCNC: 93 MG/DL (ref 65–100)
HCT VFR BLD AUTO: 28.7 % (ref 41.1–50.3)
HCT VFR BLD AUTO: 31.6 % (ref 41.1–50.3)
HGB BLD-MCNC: 10.4 G/DL (ref 13.6–17.2)
HGB BLD-MCNC: 9.3 G/DL (ref 13.6–17.2)
MCH RBC QN AUTO: 29 PG (ref 26.1–32.9)
MCH RBC QN AUTO: 29.1 PG (ref 26.1–32.9)
MCHC RBC AUTO-ENTMCNC: 32.4 G/DL (ref 31.4–35)
MCHC RBC AUTO-ENTMCNC: 32.9 G/DL (ref 31.4–35)
MCV RBC AUTO: 88.5 FL (ref 79.6–97.8)
MCV RBC AUTO: 89.4 FL (ref 79.6–97.8)
P-R INTERVAL, ECG05: 174 MS
PLATELET # BLD AUTO: 192 K/UL (ref 150–450)
PLATELET # BLD AUTO: 244 K/UL (ref 150–450)
PMV BLD AUTO: 10.5 FL (ref 10.8–14.1)
PMV BLD AUTO: 10.7 FL (ref 10.8–14.1)
POTASSIUM SERPL-SCNC: 4.1 MMOL/L (ref 3.5–5.1)
Q-T INTERVAL, ECG07: 336 MS
QRS DURATION, ECG06: 98 MS
QTC CALCULATION (BEZET), ECG08: 424 MS
RBC # BLD AUTO: 3.21 M/UL (ref 4.23–5.67)
RBC # BLD AUTO: 3.57 M/UL (ref 4.23–5.67)
SODIUM SERPL-SCNC: 139 MMOL/L (ref 136–145)
URATE SERPL-MCNC: 4.9 MG/DL (ref 2.6–6)
VENTRICULAR RATE, ECG03: 96 BPM
WBC # BLD AUTO: 11 K/UL (ref 4.3–11.1)
WBC # BLD AUTO: 13.8 K/UL (ref 4.3–11.1)

## 2017-06-22 PROCEDURE — 74011250636 HC RX REV CODE- 250/636: Performed by: HOSPITALIST

## 2017-06-22 PROCEDURE — 73501 X-RAY EXAM HIP UNI 1 VIEW: CPT

## 2017-06-22 PROCEDURE — 74011250637 HC RX REV CODE- 250/637: Performed by: ORTHOPAEDIC SURGERY

## 2017-06-22 PROCEDURE — 85027 COMPLETE CBC AUTOMATED: CPT | Performed by: ORTHOPAEDIC SURGERY

## 2017-06-22 PROCEDURE — 74011250637 HC RX REV CODE- 250/637: Performed by: HOSPITALIST

## 2017-06-22 PROCEDURE — 36415 COLL VENOUS BLD VENIPUNCTURE: CPT | Performed by: ORTHOPAEDIC SURGERY

## 2017-06-22 PROCEDURE — 71010 XR CHEST PORT: CPT

## 2017-06-22 PROCEDURE — 97530 THERAPEUTIC ACTIVITIES: CPT

## 2017-06-22 PROCEDURE — 84550 ASSAY OF BLOOD/URIC ACID: CPT | Performed by: HOSPITALIST

## 2017-06-22 PROCEDURE — 93005 ELECTROCARDIOGRAM TRACING: CPT | Performed by: HOSPITALIST

## 2017-06-22 PROCEDURE — 77030027138 HC INCENT SPIROMETER -A

## 2017-06-22 PROCEDURE — 74011250637 HC RX REV CODE- 250/637: Performed by: ANESTHESIOLOGY

## 2017-06-22 PROCEDURE — 73620 X-RAY EXAM OF FOOT: CPT

## 2017-06-22 PROCEDURE — 80048 BASIC METABOLIC PNL TOTAL CA: CPT | Performed by: HOSPITALIST

## 2017-06-22 PROCEDURE — 65270000029 HC RM PRIVATE

## 2017-06-22 RX ORDER — COLCHICINE 0.6 MG/1
0.6 CAPSULE ORAL ONCE
Status: COMPLETED | OUTPATIENT
Start: 2017-06-22 | End: 2017-06-22

## 2017-06-22 RX ORDER — HYDROCODONE BITARTRATE AND ACETAMINOPHEN 10; 325 MG/1; MG/1
1 TABLET ORAL
Qty: 60 TAB | Refills: 0 | Status: SHIPPED | OUTPATIENT
Start: 2017-06-22 | End: 2017-07-21

## 2017-06-22 RX ADMIN — ASPIRIN 81 MG 81 MG: 81 TABLET ORAL at 09:05

## 2017-06-22 RX ADMIN — FERROUS SULFATE TAB 325 MG (65 MG ELEMENTAL FE) 325 MG: 325 (65 FE) TAB at 09:05

## 2017-06-22 RX ADMIN — SODIUM CHLORIDE 1000 ML: 900 INJECTION, SOLUTION INTRAVENOUS at 16:14

## 2017-06-22 RX ADMIN — FINASTERIDE 5 MG: 5 TABLET, FILM COATED ORAL at 19:01

## 2017-06-22 RX ADMIN — Medication 10 ML: at 21:54

## 2017-06-22 RX ADMIN — FAMOTIDINE 20 MG: 20 TABLET ORAL at 09:05

## 2017-06-22 RX ADMIN — SODIUM CHLORIDE 150 ML/HR: 900 INJECTION, SOLUTION INTRAVENOUS at 00:00

## 2017-06-22 RX ADMIN — Medication 10 ML: at 16:19

## 2017-06-22 RX ADMIN — HYDROCODONE BITARTRATE AND ACETAMINOPHEN 1 TABLET: 10; 325 TABLET ORAL at 09:11

## 2017-06-22 RX ADMIN — ATORVASTATIN CALCIUM 10 MG: 10 TABLET, FILM COATED ORAL at 21:54

## 2017-06-22 RX ADMIN — ALLOPURINOL 300 MG: 300 TABLET ORAL at 21:54

## 2017-06-22 RX ADMIN — Medication 10 ML: at 06:14

## 2017-06-22 RX ADMIN — OXYCODONE HYDROCHLORIDE AND ACETAMINOPHEN 250 MG: 500 TABLET ORAL at 09:08

## 2017-06-22 RX ADMIN — MAGNESIUM HYDROXIDE 30 ML: 400 SUSPENSION ORAL at 09:04

## 2017-06-22 RX ADMIN — GABAPENTIN 600 MG: 300 CAPSULE ORAL at 09:05

## 2017-06-22 RX ADMIN — COLCHICINE 0.6 MG: 0.6 CAPSULE ORAL at 16:03

## 2017-06-22 RX ADMIN — FAMOTIDINE 20 MG: 20 TABLET ORAL at 21:54

## 2017-06-22 NOTE — CONSULTS
Hospitalist H&P/Consult Note     Admit Date:  2017  5:31 AM   Name:  Carleen Powers   Age:  64 y.o.  :  1940   MRN:  [id number]   PCP:  Courtney Turcios MD  Treatment Team: Attending Provider: Ronny Alanis MD; Utilization Review: Jessica Sheehan RN; Consulting Provider: Delfino Lowery MD    HPI:   We are consulted this evening for asymptomatic hypotension. Mr Cesar Cisneros is a very pleasant 63 y/o gentleman with hx CAD, HTN who underwent L3-5 laminectomy/ fusion for spinal stenosis. Patien'ts blood pressure has been running in the 95Z systolic but he has been asymptomatic. No chest pain, shortness of breath, dizziness or syncope. They have kept him in bed since his BP became low. I discussed with staff and they documented 310cc drainage from hemovac today and 330 yesterday. Patient states he is drinking fluids. Hgb on 3/27 was 13.8 and 13.3 on . This am 10.2 and tonight we checked was 9.6. He denies any GI bleeding. BMP tonight with BUN 25 and Cr 1.65. He may have some underlying renal insufficiency because Cr 1.32 in March. He is on lisinopril for hypertension and flomax for prostate. 10 systems reviewed and negative except as noted in HPI.   Past Medical History:   Diagnosis Date    Back problem     Chronic pain     Chronic systolic heart failure (HCC)     ef 40-45% echo     Circulation problem     Colon polyps     Coronary artery disease 2010    CABG X3 VESSELS-had  a flutter 10/2010 and cardioverted    Gout     Hyperlipidemia     Hypertension     Obesity (BMI 30-39.9)     35.5    Pain in joint, multiple sites      rheumatoid Arthritis     S/P CABG x 3     SVT (supraventricular tachycardia) ([address]        Past Surgical History:   Procedure Laterality Date    CARDIAC SURG PROCEDURE UNLIST  2010    CABG-had a flutter 2 mo after    COLONOSCOPY      HX CATARACT REMOVAL Left 11/15/2016    HX CATARACT REMOVAL Right 2016    HX CORONARY ARTERY BYPASS GRAFT 2012    x 3 vessels    HX CYST REMOVAL      pilonidial    HX KNEE ARTHROSCOPY Bilateral     HX ORTHOPAEDIC      open left shoulder      Prior to Admission Medications   Prescriptions Last Dose Informant Patient Reported? Taking? Cholecalciferol, Vitamin D3, (VITAMIN D3) 1,000 unit cap 6/12/2017  Yes No   Sig: Take 2,000 Units by mouth every morning. Indications: PREVENTION OF VITAMIN D DEFICIENCY   METHYL-B12/L-MEFOLATE/B6 PHOS (METANX PO) 6/12/2017  Yes No   Sig: Take  by mouth two (2) times a day. Indications: energy supplement   allopurinol (ZYLOPRIM) 300 mg tablet 6/19/2017 at Unknown time  No Yes   Sig: Take 1 Tab by mouth nightly. Indications: URIC ACID NEPHROPATHY GOUT   aspirin delayed-release 81 mg tablet 6/18/2017  Yes No   Sig: Take 81 mg by mouth daily. atorvastatin (LIPITOR) 10 mg tablet 6/19/2017 at Unknown time  No Yes   Sig: Take 1 Tab by mouth nightly. Indications: hypercholestermia   finasteride (PROSCAR) 5 mg tablet 6/19/2017 at Unknown time  No Yes   Sig: Take 1 Tab by mouth every evening. Indications: BENIGN PROSTATIC HYPERPLASIA   gabapentin (NEURONTIN) 600 mg tablet 6/19/2017 at Unknown time  No Yes   Sig: Take 1 tablet by mouth  daily   lisinopril (PRINIVIL, ZESTRIL) 10 mg tablet 6/19/2017 at Unknown time  No Yes   Sig: Take 1 tab po daily  Indications: hypertension   meloxicam (MOBIC) 7.5 mg tablet 6/12/2017  No No   Sig: Take 1 Tab by mouth daily. omega-3 fatty acids-vitamin e (FISH OIL) 1,000 mg cap 6/12/2017  Yes No   Sig: Take 1 Cap by mouth daily. Indications: hold until after surgery   tamsulosin (FLOMAX) 0.4 mg capsule 6/19/2017 at Unknown time  No Yes   Sig: Take 1 Cap by mouth every evening.  Indications: BENIGN PROSTATIC HYPERPLASIA      Facility-Administered Medications: None     No Known Allergies   Social History   Substance Use Topics    Smoking status: Never Smoker    Smokeless tobacco: Never Used    Alcohol use 0.0 oz/week     0 Standard drinks or equivalent per week      Comment: social      Family History   Problem Relation Age of Onset    Cancer Mother      ? type    Cancer Father      throat    No Known Problems Brother     No Known Problems Brother       Immunization History   Administered Date(s) Administered    Influenza High Dose Vaccine PF 09/21/2009, 10/08/2010, 09/23/2011, 09/21/2012, 10/11/2013    Influenza Vaccine 10/01/2014, 10/26/2015, 09/09/2016    Pneumococcal Conjugate (PCV-13) 10/21/2015    Pneumococcal Polysaccharide (PPSV-23) 11/19/2009    Tdap 11/02/2011    Zoster Vaccine, Live 11/24/2009       Objective:     Patient Vitals for the past 24 hrs:   Temp Pulse Resp BP SpO2   06/21/17 2109 98.5 °F (36.9 °C) (!) 110 18 90/57 92 %   06/21/17 1423 98 °F (36.7 °C) 99 18 104/56 95 %   06/21/17 1225 - (!) 104 - 100/55 94 %   06/21/17 1140 - - - - 92 %   06/21/17 1131 98.1 °F (36.7 °C) (!) 111 17 (!) 88/58 92 %   06/21/17 0904 - (!) 120 - - 92 %   06/21/17 0741 98.8 °F (37.1 °C) (!) 121 18 121/66 92 %   06/21/17 0355 98.1 °F (36.7 °C) (!) 106 18 132/78 94 %     Oxygen Therapy  O2 Sat (%): 92 % (06/21/17 2109)  Pulse via Oximetry: 76 beats per minute (06/21/17 1140)  O2 Device: Nasal cannula (06/21/17 1140)  O2 Flow Rate (L/min): 2 l/min (06/21/17 1140)    Intake/Output Summary (Last 24 hours) at 06/21/17 2352  Last data filed at 06/21/17 1817   Gross per 24 hour   Intake                0 ml   Output             1165 ml   Net            -1165 ml       Physical Exam:  General:    Well nourished. Alert. In good spirits   Eyes:   Normal sclera. Extraocular movements intact. ENT:  Normocephalic, atraumatic. Moist mucous membranes  CV:   RRR. No murmur, rub, or gallop. Lungs:  CTAB. No wheezing, rhonchi, or rales. Abdomen: Soft, nontender, nondistended. Bowel sounds normal.   Extremities: Warm and dry. No cyanosis or edema. Neurologic: CN II-XII grossly intact. Sensation intact. Skin:     No rashes or jaundice.   hemovac attached to back Psych:  Normal mood and affect. I reviewed the labs, imaging, EKGs, telemetry, and other studies done this admission. Data Review:   Recent Results (from the past 24 hour(s))   CBC W/O DIFF    Collection Time: 06/21/17  7:10 AM   Result Value Ref Range    WBC 10.7 4.3 - 11.1 K/uL    RBC 3.56 (L) 4.23 - 5.67 M/uL    HGB 10.2 (L) 13.6 - 17.2 g/dL    HCT 31.4 (L) 41.1 - 50.3 %    MCV 88.2 79.6 - 97.8 FL    MCH 28.7 26.1 - 32.9 PG    MCHC 32.5 31.4 - 35.0 g/dL    RDW 15.5 (H) 11.9 - 14.6 %    PLATELET 049 046 - 094 K/uL    MPV 10.5 (L) 10.8 - 14.1 FL   CBC W/O DIFF    Collection Time: 06/21/17 10:05 PM   Result Value Ref Range    WBC 11.6 (H) 4.3 - 11.1 K/uL    RBC 3.34 (L) 4.23 - 5.67 M/uL    HGB 9.6 (L) 13.6 - 17.2 g/dL    HCT 29.7 (L) 41.1 - 50.3 %    MCV 88.9 79.6 - 97.8 FL    MCH 28.7 26.1 - 32.9 PG    MCHC 32.3 31.4 - 35.0 g/dL    RDW 15.6 (H) 11.9 - 14.6 %    PLATELET 156 476 - 507 K/uL    MPV 10.8 10.8 - 96.3 FL   METABOLIC PANEL, COMPREHENSIVE    Collection Time: 06/21/17 10:05 PM   Result Value Ref Range    Sodium 139 136 - 145 mmol/L    Potassium 4.5 3.5 - 5.1 mmol/L    Chloride 104 98 - 107 mmol/L    CO2 27 21 - 32 mmol/L    Anion gap 8 7 - 16 mmol/L    Glucose 106 (H) 65 - 100 mg/dL    BUN 25 (H) 8 - 23 MG/DL    Creatinine 1.65 (H) 0.8 - 1.5 MG/DL    GFR est AA 52 (L) >60 ml/min/1.73m2    GFR est non-AA 43 (L) >60 ml/min/1.73m2    Calcium 8.4 8.3 - 10.4 MG/DL    Bilirubin, total 0.6 0.2 - 1.1 MG/DL    ALT (SGPT) 12 12 - 65 U/L    AST (SGOT) 27 15 - 37 U/L    Alk.  phosphatase 54 50 - 136 U/L    Protein, total 5.9 (L) 6.3 - 8.2 g/dL    Albumin 2.7 (L) 3.2 - 4.6 g/dL    Globulin 3.2 2.3 - 3.5 g/dL    A-G Ratio 0.8 (L) 1.2 - 3.5     MAGNESIUM    Collection Time: 06/21/17 10:05 PM   Result Value Ref Range    Magnesium 2.4 1.8 - 2.4 mg/dL   TSH 3RD GENERATION    Collection Time: 06/21/17 10:05 PM   Result Value Ref Range    TSH 0.209 (L) 0.358 - 3.740 uIU/mL       Imaging Studies:  CXR Results  (Last 48 hours)    None        CT Results  (Last 48 hours)    None          Assessment and Plan:     Hospital Problems as of 6/21/2017  Date Reviewed: 6/20/2017          Codes Class Noted - Resolved POA    Hypotension ICD-10-CM: I95.9  ICD-9-CM: 458.9  6/21/2017 - Present No        Acute blood loss anemia ICD-10-CM: D62  ICD-9-CM: 285.1  6/21/2017 - Present Unknown        Spondylolisthesis of lumbar region ICD-10-CM: M43.16  ICD-9-CM: 738.4  6/20/2017 - Present Unknown        Spinal stenosis, lumbar region, with neurogenic claudication ICD-10-CM: M48.06  ICD-9-CM: 724.03  6/20/2017 - Present Unknown        * (Principal)Spinal stenosis of lumbar region ICD-10-CM: M48.06  ICD-9-CM: 724.02  12/21/2016 - Present Yes              PLAN:  · Patient's hypotension appears to be multifactorial due to acute blood loss anemia  pre-renal azotemia while still taking lisinopril  · Will hold BP meds for now  · With renal insufficiency will also discontinue mobic and would avoid any potentially nephrotoxic medications such as NSAIDS  · Administer IVF until renal fxn improved  · Monitor daily H&H  · Start oral iron supplementation (with vitamin C for increased absorption)  · No indication to transfuse currently but with hx CAD would consider if became symptomatic or if Hgb dropped much below 9  · Thank you for consultation.  Will follow and assist with his care         Signed:  Sal Jain MD

## 2017-06-22 NOTE — DISCHARGE SUMMARY
Discharge Summary    Patient ID:Shukri Lazar  [id number]  1940  64 y.o. Admit date: 6/20/2017    Discharge date: 06/23/2017    Admitting Physician: Lian Flores MD    Discharge Physician: Lian Flores MD    Admission Diagnoses: spinal stenosis, spondylolisthesis    Discharge Diagnoses: Acute and Chronic problems addressed during this hospital admission  Principal Problem:    Spinal stenosis of lumbar region (12/21/2016)    Active Problems:    Spondylolisthesis of lumbar region (6/20/2017)      Spinal stenosis, lumbar region, with neurogenic claudication (6/20/2017)      Hypotension (6/21/2017)      Acute blood loss anemia (6/21/2017)        Surgeon: Lian Flores MD    Procedure: Procedure(s):  L3-L5 LAMI FUSION SPINE TRANSFORAMINAL LUMBAR INTERBODY FUSION (TLIF) REMOVAL OF SYNOVIAL CYST L4    INDICATIONS FOR ADMISSION/PROCEDURE:  The patient has had low back pain with radiation to the buttocks and lower extremities for an extended period of time. The symptoms and exam findings were felt to be consistent with neurogenic claudication and radicular pain. The preoperative radiographs and MRI/CT showed spondylolisthesis and spinal stenosis and synovial cyst. Conservative measures have been exhausted. The symptoms progressed to the point where there is difficulty performing any task that requires prolonged standing or walking. In the outpatient setting the risks, benefits and potential complications of the above-listed procedure were discussed with her and an informed consent was obtained. HBG at Discharge:   Recent Labs      06/22/17   1447   HGB  10.4[facility] Course: Patient admitted to ortho floor. Antibiotics were given postop. SCD and iris hose were in place for DVT prophylaxis. Bean catheter was in place until POD#1 then discontinued. Patient did not receive blood transfusion. The patient tolerated pain medications and po diet. He had hypotension and tachycardia. Hospitalists were consulted to manage and he remained medically stable. The hemovac drain output gradually diminished and was then removed. Dressing remained clean, dry, and intact. Physical Therapy started on the day following surgery and progressed to ambulation without assistance. At the time of discharge, the patient had understanding of precautions needed following surgery. Postoperative complications requiring extended length of stay: None    Discharged to: Home    New Medications: Norco    Discharge instructions:  -Resume pre hospital diet            -Resume home medications per medical continuation form     -Follow up in office as scheduled   -Call doctor immediately if T>100.5, increased pain, swelling, drainage.   -If shortness of breath or chest pain, immediately go to ER  -Post surgical instruction sheet given to patient    Signed:  ROLAN Mcintyre  6/22/2017

## 2017-06-22 NOTE — PROGRESS NOTES
Hospitalist H&P/Consult Note     Admit Date:  2017  5:31 AM   Name:  Yolis Breen   Age:  64 y.o.  :  1940   MRN:  [id number]   PCP:  Kelly Acevedo MD  Treatment Team: Attending Provider: Roly Gilbert MD; Utilization Review: Hasmukh Loco RN; Consulting Provider: Nena Canas MD; Charge Nurse: Johnnie Wolff RN    HPI:   Mr Lossie Leyden is a very pleasant 65 y/o gentleman with hx CAD, HTN who underwent L3-5 laminectomy/ fusion for spinal stenosis. Patien'ts blood pressure has been running in the 27Z systolic but he has been asymptomatic. No chest pain, shortness of breath, dizziness or syncope. They have kept him in bed since his BP became low. I discussed with staff and they documented 310cc drainage from hemovac today and 330 yesterday. Patient states he is drinking fluids. Hgb on 3/27 was 13.8 and 13.3 on . This am 10.2 and tonight we checked was 9.6. He denies any GI bleeding. BMP lastnight with BUN 25 and Cr 1.65. He may have some underlying renal insufficiency because Cr 1.32 in March. He is on lisinopril for hypertension and flomax for prostate. :  Seen at bedside, resting comfortably. Feeling well overall, but complains of left heel and right hip pain. States that \"my gout might be flaring up. \"  Denies SOB, chest pain, cough, palpitations. 10 systems reviewed and negative except as noted in HPI.   Past Medical History:   Diagnosis Date    Back problem     Chronic pain     Chronic systolic heart failure (HCC)     ef 40-45% echo     Circulation problem     Colon polyps     Coronary artery disease 2010    CABG X3 VESSELS-had  a flutter 10/2010 and cardioverted    Gout     Hyperlipidemia     Hypertension     Obesity (BMI 30-39.9)     35.5    Pain in joint, multiple sites      rheumatoid Arthritis     S/P CABG x 3     SVT (supraventricular tachycardia) ([facility] [address]        Past Surgical History:   Procedure Laterality Date    CARDIAC SURG PROCEDURE UNLIST  08/2010    CABG-had a flutter 2 mo after    COLONOSCOPY      HX CATARACT REMOVAL Left 11/15/2016    HX CATARACT REMOVAL Right 11/25/2016    HX CORONARY ARTERY BYPASS GRAFT  2012    x 3 vessels    HX CYST REMOVAL      pilonidial    HX KNEE ARTHROSCOPY Bilateral     HX ORTHOPAEDIC      open left shoulder      Prior to Admission Medications   Prescriptions Last Dose Informant Patient Reported? Taking? Cholecalciferol, Vitamin D3, (VITAMIN D3) 1,000 unit cap 6/12/2017  Yes No   Sig: Take 2,000 Units by mouth every morning. Indications: PREVENTION OF VITAMIN D DEFICIENCY   METHYL-B12/L-MEFOLATE/B6 PHOS (METANX PO) 6/12/2017  Yes No   Sig: Take  by mouth two (2) times a day. Indications: energy supplement   allopurinol (ZYLOPRIM) 300 mg tablet 6/19/2017 at Unknown time  No Yes   Sig: Take 1 Tab by mouth nightly. Indications: URIC ACID NEPHROPATHY GOUT   aspirin delayed-release 81 mg tablet 6/18/2017  Yes No   Sig: Take 81 mg by mouth daily. atorvastatin (LIPITOR) 10 mg tablet 6/19/2017 at Unknown time  No Yes   Sig: Take 1 Tab by mouth nightly. Indications: hypercholestermia   finasteride (PROSCAR) 5 mg tablet 6/19/2017 at Unknown time  No Yes   Sig: Take 1 Tab by mouth every evening. Indications: BENIGN PROSTATIC HYPERPLASIA   gabapentin (NEURONTIN) 600 mg tablet 6/19/2017 at Unknown time  No Yes   Sig: Take 1 tablet by mouth  daily   lisinopril (PRINIVIL, ZESTRIL) 10 mg tablet 6/19/2017 at Unknown time  No Yes   Sig: Take 1 tab po daily  Indications: hypertension   meloxicam (MOBIC) 7.5 mg tablet 6/12/2017  No No   Sig: Take 1 Tab by mouth daily. omega-3 fatty acids-vitamin e (FISH OIL) 1,000 mg cap 6/12/2017  Yes No   Sig: Take 1 Cap by mouth daily. Indications: hold until after surgery   tamsulosin (FLOMAX) 0.4 mg capsule 6/19/2017 at Unknown time  No Yes   Sig: Take 1 Cap by mouth every evening.  Indications: BENIGN PROSTATIC HYPERPLASIA      Facility-Administered Medications: None     No Known Allergies   Social History   Substance Use Topics    Smoking status: Never Smoker    Smokeless tobacco: Never Used    Alcohol use 0.0 oz/week     0 Standard drinks or equivalent per week      Comment: social      Family History   Problem Relation Age of Onset    Cancer Mother      ? type    Cancer Father      throat    No Known Problems Brother     No Known Problems Brother       Immunization History   Administered Date(s) Administered    Influenza High Dose Vaccine PF 09/21/2009, 10/08/2010, 09/23/2011, 09/21/2012, 10/11/2013    Influenza Vaccine 10/01/2014, 10/26/2015, 09/09/2016    Pneumococcal Conjugate (PCV-13) 10/21/2015    Pneumococcal Polysaccharide (PPSV-23) 11/19/2009    Tdap 11/02/2011    Zoster Vaccine, Live 11/24/2009       Objective:     Patient Vitals for the past 24 hrs:   Temp Pulse Resp BP SpO2   06/22/17 0804 98.1 °F (36.7 °C) 98 18 126/70 92 %   06/22/17 0429 98.1 °F (36.7 °C) (!) 109 18 107/56 93 %   06/22/17 0111 98.6 °F (37 °C) (!) 107 18 91/43 92 %   06/21/17 2109 98.5 °F (36.9 °C) (!) 110 18 90/57 92 %   06/21/17 1423 98 °F (36.7 °C) 99 18 104/56 95 %   06/21/17 1225 - (!) 104 - 100/55 94 %   06/21/17 1140 - - - - 92 %   06/21/17 1131 98.1 °F (36.7 °C) (!) 111 17 (!) 88/58 92 %   06/21/17 0904 - (!) 120 - - 92 %     Oxygen Therapy  O2 Sat (%): 92 % (06/22/17 0804)  Pulse via Oximetry: 76 beats per minute (06/21/17 1140)  O2 Device: Nasal cannula (06/21/17 1140)  O2 Flow Rate (L/min): 2 l/min (06/21/17 1140)    Intake/Output Summary (Last 24 hours) at 06/22/17 0821  Last data filed at 06/22/17 0737   Gross per 24 hour   Intake                0 ml   Output              910 ml   Net             -910 ml       Physical Exam:  General:    NAD  HEENT:           Head AT/NC, PERRLA+, no pallor or ict, MMM  CV:   RRR. No murmur, rub, or gallop. Lungs:  CTAB. Fine basilar crackles on left base, no wheezing. Abdomen: Soft, nontender, nondistended.  Bowel sounds normal. Extremities: Warm and dry. No cyanosis or edema. Neurologic: CN II-XII grossly intact. Sensation intact. Skin:     No rashes or jaundice. hemovac attached to back   Psych:  Normal mood and affect. I reviewed the labs, imaging, EKGs, telemetry, and other studies done this admission. Data Review:   Recent Results (from the past 24 hour(s))   CBC W/O DIFF    Collection Time: 06/21/17 10:05 PM   Result Value Ref Range    WBC 11.6 (H) 4.3 - 11.1 K/uL    RBC 3.34 (L) 4.23 - 5.67 M/uL    HGB 9.6 (L) 13.6 - 17.2 g/dL    HCT 29.7 (L) 41.1 - 50.3 %    MCV 88.9 79.6 - 97.8 FL    MCH 28.7 26.1 - 32.9 PG    MCHC 32.3 31.4 - 35.0 g/dL    RDW 15.6 (H) 11.9 - 14.6 %    PLATELET 094 031 - 229 K/uL    MPV 10.8 10.8 - 57.2 FL   METABOLIC PANEL, COMPREHENSIVE    Collection Time: 06/21/17 10:05 PM   Result Value Ref Range    Sodium 139 136 - 145 mmol/L    Potassium 4.5 3.5 - 5.1 mmol/L    Chloride 104 98 - 107 mmol/L    CO2 27 21 - 32 mmol/L    Anion gap 8 7 - 16 mmol/L    Glucose 106 (H) 65 - 100 mg/dL    BUN 25 (H) 8 - 23 MG/DL    Creatinine 1.65 (H) 0.8 - 1.5 MG/DL    GFR est AA 52 (L) >60 ml/min/1.73m2    GFR est non-AA 43 (L) >60 ml/min/1.73m2    Calcium 8.4 8.3 - 10.4 MG/DL    Bilirubin, total 0.6 0.2 - 1.1 MG/DL    ALT (SGPT) 12 12 - 65 U/L    AST (SGOT) 27 15 - 37 U/L    Alk.  phosphatase 54 50 - 136 U/L    Protein, total 5.9 (L) 6.3 - 8.2 g/dL    Albumin 2.7 (L) 3.2 - 4.6 g/dL    Globulin 3.2 2.3 - 3.5 g/dL    A-G Ratio 0.8 (L) 1.2 - 3.5     MAGNESIUM    Collection Time: 06/21/17 10:05 PM   Result Value Ref Range    Magnesium 2.4 1.8 - 2.4 mg/dL   TSH 3RD GENERATION    Collection Time: 06/21/17 10:05 PM   Result Value Ref Range    TSH 0.209 (L) 0.358 - 3.740 uIU/mL   CBC W/O DIFF    Collection Time: 06/22/17  7:40 AM   Result Value Ref Range    WBC 11.0 4.3 - 11.1 K/uL    RBC 3.21 (L) 4.23 - 5.67 M/uL    HGB 9.3 (L) 13.6 - 17.2 g/dL    HCT 28.7 (L) 41.1 - 50.3 %    MCV 89.4 79.6 - 97.8 FL    MCH 29.0 26.1 - 32.9 PG MCHC 32.4 31.4 - 35.0 g/dL    RDW 15.6 (H) 11.9 - 14.6 %    PLATELET 569 845 - 052 K/uL    MPV 10.5 (L) 10.8 - 14.1 FL       Imaging Studies:  CXR Results  (Last 48 hours)    None        CT Results  (Last 48 hours)    None          Assessment and Plan:     Hospital Problems as of 6/22/2017  Date Reviewed: 6/20/2017          Codes Class Noted - Resolved POA    Hypotension ICD-10-CM: I95.9  ICD-9-CM: 458.9  6/21/2017 - Present No        Acute blood loss anemia ICD-10-CM: D62  ICD-9-CM: 285.1  6/21/2017 - Present Unknown        Spondylolisthesis of lumbar region ICD-10-CM: M43.16  ICD-9-CM: 738.4  6/20/2017 - Present Unknown        Spinal stenosis, lumbar region, with neurogenic claudication ICD-10-CM: M48.06  ICD-9-CM: 724.03  6/20/2017 - Present Unknown        * (Principal)Spinal stenosis of lumbar region ICD-10-CM: M48.06  ICD-9-CM: 724.02  12/21/2016 - Present Yes              PLAN:  · Blood pressure improved, SBP staying > 100  · Continue to hold BP meds for now  · Renal function improved after IV hydration from Cr 1.65 to 1.49. Will continue to give intermittent boluses. · Hb 9.3, will continue to monitor. Given hx of CAD, if Hb drops < 9, will transfuse 1 units. · Continue oral iron  · X-ray of chest, left foot and pelvis reviewed. No signs of acute fracture of dislocation,  Uric acid is 4.9, will give one time dose of colchicine. Chest showed mild left basilar scarring?, would encourage incentive spirometry. will continue to monitor.     Signed:  Nasir Whalen MD

## 2017-06-22 NOTE — PROGRESS NOTES
[facility] POST OP PROGRESS NOTE    2017  Admit Date: 2017  Admit Diagnosis: Lumbar stenosis [M48.06]  Spondylolisthesis of lumbar region [M43.16]  Procedure: Procedure(s):  L3-L5 LAMI FUSION SPINE TRANSFORAMINAL LUMBAR INTERBODY FUSION (TLIF) REMOVAL OF SYNOVIAL CYST L4  Post Op day: 2 Days Post-Op      Subjective:     Ana Maria Birch is a patient who has no complaints. Walked 250 ft. Feels well. Would like to go home. Hospitalist consulted for hypiotension, improving now. Watching HGB and they recommend to transfuse if drops < 9. Last result was 10.4. Objective:     Vital Signs:    Blood pressure 102/71, pulse (!) 111, temperature 98.1 °F (36.7 °C), resp. rate 18, height 6' 4\" (1.93 m), weight 136.9 kg (301 lb 11.2 oz), SpO2 95 %. Temp (24hrs), Av.4 °F (36.9 °C), Min:98.1 °F (36.7 °C), Max:99.1 °F (37.3 °C)       07 -  1900  In: -   Out: 600 [Urine:450; Drains:150]  1901 -  0700  In: -   Out: 6122 [Urine:1075; Drains:690]    LAB:    Recent Labs      17   1447   HGB  10.4*   WBC  13.8*   PLT  244       Physical Exam    General:   Alert and oriented. No acute distress  Lungs:  Respirations unlabored. Extremities: No evidence of cyanosis. Calves soft, nontender. Moves both upper and lower extremities. Dressing:  clean, dry, and intact  Neuro:  no deficit      Assessment:      Patient Active Problem List   Diagnosis Code    Coronary artery disease I25.10    History of coronary artery bypass graft x 3 Z95.1    Hypercholesterolemia E78.00    BPH (benign prostatic hypertrophy) N40.0    Neuropathy, peripheral (HCC) G62.9    Gout M10.9    Hypertension I10    Rheumatoid arthritis of hand ([facility] [place] [address] M06.9    SV[facility] tachycardia) ([facility]) I47.1    Spinal stenosis of lumbar region M48.06    Obesity (BMI 30-39. 9) E66.9    Spondylolisthesis of lumbar region M43.16    Spinal stenosis, lumbar region, with neurogenic claudication M48.06    Hypotension I95.9  Acute blood loss anemia D62       Plan:     Continue PT  Discontinue: drain when less than 80. Last shift 150.    Consult: none    Anticipate Discharge To: HOME tomorrow        Signed By: Cassidy Tadeo PA-C

## 2017-06-22 NOTE — PROGRESS NOTES
Problem: Mobility Impaired (Adult and Pediatric)  Goal: *Acute Goals and Plan of Care (Insert Text)  LTG:  (1.)Mr. Black will move from supine to sit and sit to supine and roll side to side, using log roll technique, with MODIFIED INDEPENDENCE within 7 day(s). (2.)Mr. Black will transfer from bed to chair and chair to bed with MODIFIED INDEPENDENCE using the least restrictive device within 7 day(s). (3.)Mr. Black will ambulate with MODIFIED INDEPENDENCE for 500 feet with the least restrictive device within 7 day(s). (4.)Mr. Black will verbalize 3/3 post-op spinal precautions with INDEPENDENCE within 7 day(s). (5.)Mr. Black will ascend and descend 5 stairs using B hand rail(s) with SUPERVISION to improve functional mobility and safety within 7 day(s). ________________________________________________________________________________________________      PHYSICAL THERAPY: Daily Note, Treatment Day: 1st and AM 6/22/2017  INPATIENT: Hospital Day: 3  Payor: CARE IMPROVEMENT PLUS [place] / Product Type: Managed Care Medicare /    Spinal precautions and brace     NAME/AGE/GENDER: Loree aHirston is a 64 y.o. 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 ASSESSMENT:      Mr. Grey Sarabia is a 64 y.o. male s/p above who was supine in bed on arrival.  Pt reports that he lives with his wife in a one story house with 4-5 steps to enter.  He recalled 3/3 precautions prior to getting up. He required verbal and manual cues as well as mod assist to roll to side and side up and then he was leaning heavily to his right and posteriorly. Attempted to put his brace on but it would not fit around him. He stood and attempted again. Pt was attempting to don it incorrectly and began to argue about why his way was right when he was visually shown how and told why it needed to be put on a certain way. Once brace was applied pt stood and ambulated ~ 5' in room and xray entered and stated they had a stat chest xray to do. Pt was assisted to turn around and ambulate to chair. Pt left in chair with family and xray present. He is impulsive. Did not do as well today as he did yesterday morning. Will continue efforts. This section established at most recent assessment   PROBLEM LIST (Impairments causing functional limitations):  1. Decreased Strength  2. Decreased Transfer Abilities  3. Decreased Ambulation Ability/Technique  4. Decreased Balance  5. Decreased Activity Tolerance    INTERVENTIONS PLANNED: (Benefits and precautions of physical therapy have been discussed with the patient.)  1. Balance Exercise  2. Bed Mobility  3. Family Education  4. Gait Training  5. Neuromuscular Re-education/Strengthening  6. Therapeutic Activites  7. Therapeutic Exercise/Strengthening  8. Transfer Training  9. Group Therapy      TREATMENT PLAN: Frequency/Duration: twice daily for duration of hospital stay  Rehabilitation Potential For Stated Goals: GOOD      RECOMMENDED REHABILITATION/EQUIPMENT: (at time of discharge pending progress): Continue Skilled Therapy. HISTORY:   History of Present Injury/Illness (Reason for Referral):  S/P L3-L5 LAMI FUSION SPINE TRANSFORAMINAL LUMBAR INTERBODY FUSION (TLIF) REMOVAL OF SYNOVIAL CYST L4 (N/A)  Past Medical History/Comorbidities:   Mr. Noe Woodson  has a past medical history of Back problem; Chronic pain; Chronic systolic heart failure ([facility] [address]  Circulation problem; Colon polyps; Coronary artery disease (08/2010); Gout; Hyperlipidemia; Hypertension; Obesity (BMI 30-39.9); Pain in joint, multiple sites ( ); rheumatoid Arthritis; S/P CABG x 3; and SVT (supraventricular tachycardia) (HCC) ( ). He also has no past medical history of Difficult intubation; Malignant hyperthermia due to anesthesia; Nausea & vomiting; Pseudocholinesterase deficiency; or Unspecified adverse effect of anesthesia. Mr. Willy Hernandez  has a past surgical history that includes colonoscopy; coronary artery bypass graft (2012); cyst removal; orthopaedic; knee arthroscopy (Bilateral); cardiac surg procedure unlist (08/2010); cataract removal (Left, 11/15/2016); and cataract removal (Right, 11/25/2016). Social History/Living Environment:   Home Environment: Private residence  # Steps to Enter: 5  One/Two Story Residence: One story  Living Alone: No  Support Systems: Spouse/Significant Other/Partner  Patient Expects to be Discharged to[de-identified] Private residence  Current DME Used/Available at Home: Wendy Cousin, straight, Walker, rolling  Tub or Shower Type: Shower  Prior Level of Function/Work/Activity:  See above      Number of Personal Factors/Comorbidities that affect the Plan of Care:  CHF  Gout  RA  Chronic pain    3+: HIGH COMPLEXITY   EXAMINATION:   Most Recent Physical Functioning:   Gross Assessment:                  Posture:  Posture (WDL): Exceptions to WDL  Posture Assessment: Forward head  Balance:  Sitting: Impaired  Sitting - Static: Fair (occasional)  Sitting - Dynamic: Fair (occasional)  Standing: Impaired  Standing - Static: Fair  Standing - Dynamic : Fair Bed Mobility:  Rolling:  Moderate assistance  Supine to Sit: Moderate assistance  Wheelchair Mobility:     Transfers:  Sit to Stand: Minimum assistance  Stand to Sit: Minimum assistance  Gait:     Speed/Lynne: Slow  Step Length: Left shortened;Right shortened  Distance (ft): 10 Feet (ft)  Assistive Device: Walker, rolling  Ambulation - Level of Assistance: Minimal assistance  Interventions: Manual cues; Safety awareness training;Verbal cues; Visual/Demos       Body Structures Involved:  1. Nerves  2. Heart  3. Lungs  4. Bones  5. Muscles Body Functions Affected:  1. Sensory/Pain  2. Cardio  3. Respiratory  4. Neuromusculoskeletal  5. Movement Related Activities and Participation Affected:  1. General Tasks and Demands  2. Mobility  3. Domestic Life  4. Community, Social and [place]   Number of elements that affect the Plan of Care: 4+: HIGH COMPLEXITY   CLINICAL PRESENTATION:   Presentation: Evolving clinical presentation with changing clinical characteristics: MODERATE COMPLEXITY   CLINICAL DECISION MAKIN [facility] Mobility Inpatient Short Form  How much difficulty does the patient currently have. .. Unable A Lot A Little None   1. Turning over in bed (including adjusting bedclothes, sheets and blankets)? [ ] 1   [ ] 2   [X] 3   [ ] 4   2. Sitting down on and standing up from a chair with arms ( e.g., wheelchair, bedside commode, etc.)   [ ] 1   [ ] 2   [X] 3   [ ] 4   3. Moving from lying on back to sitting on the side of the bed? [ ] 1   [ ] 2   [X] 3   [ ] 4   How much help from another person does the patient currently need. .. Total A Lot A Little None   4. Moving to and from a bed to a chair (including a wheelchair)? [ ] 1   [ ] 2   [X] 3   [ ] 4   5. Need to walk in hospital room? [ ] 1   [X] 2   [ ] 3   [ ] 4   6. Climbing 3-5 steps with a railing? [ ] 1   [X] 2   [ ] 3   [ ] 4   © , Trustees of [address] [place] [address] [address] [address], under license to [employer]. All rights reserved    Score:  Initial: 16 Most Recent: X (Date: -- )     Interpretation of Tool:  Represents activities that are increasingly more difficult (i.e. Bed mobility, Transfers, Gait).        Score 24 23 22-20 19-15 14-10 9-7 6       Modifier CH CI CJ CK CL CM CN         · Mobility - Walking and Moving Around:               - CURRENT STATUS:    CK - 40%-59% impaired, limited or restricted               - GOAL STATUS:           CJ - 20%-39% impaired, limited or restricted               - D/C STATUS:                       ---------------To be determined---------------  Payor: CARE IMPROVEMENT PLUS / [place] / Product Type: Managed Care Medicare /       Medical Necessity:     · Patient demonstrates good rehab potential due to higher previous functional level. Reason for Services/Other Comments:  · Patient continues to require skilled intervention due to decreased balance, functional mobility, and activity tolerance. Use of outcome tool(s) and clinical judgement create a POC that gives a: Questionable prediction of patient's progress: MODERATE COMPLEXITY                 TREATMENT:   (In addition to Assessment/Re-Assessment sessions the following treatments were rendered)   Pre-treatment Symptoms/Complaints:  Back pain  Pain: Initial:   Pain Intensity 1: 3  Post Session:  3/10      Therapeutic Activity: (    23 min): Therapeutic activities including bed mobility, chair transfers, education and gt on level surface to improve mobility, strength and balance. Required Manual cues; Safety awareness training;Verbal cues; Visual/Demos to promote dynamic balance in standing. Braces/Orthotics/Lines/Etc:   · IV  · drain (hemovac)  · O2 Device: Nasal cannula  Treatment/Session Assessment:    · Response to Treatment:  Pt tolerated treatment ok. Seems a little anxious. · Interdisciplinary Collaboration:  · Physical Therapist  · Registered Nurse  · Certified Nursing Assistant/Patient Care Technician  · After treatment position/precautions:  · Up in chair  · Call light within reach  · RN notified  · Family at bedside  · Compliance with Program/Exercises: Will assess as treatment progresses. · Recommendations/Intent for next treatment session:   \"Next visit will focus on advancements to more challenging activities and reduction in assistance provided\".   Total Treatment Duration  :PT Patient Time In/Time Out  Time In: 1017  Time Out: Magan Burton [address] Nahomi, PTA

## 2017-06-22 NOTE — PROGRESS NOTES
Problem: Mobility Impaired (Adult and Pediatric)  Goal: *Acute Goals and Plan of Care (Insert Text)  LTG:  (1.)Mr. Black will move from supine to sit and sit to supine and roll side to side, using log roll technique, with MODIFIED INDEPENDENCE within 7 day(s). (2.)Mr. Black will transfer from bed to chair and chair to bed with MODIFIED INDEPENDENCE using the least restrictive device within 7 day(s). (3.)Mr. Black will ambulate with MODIFIED INDEPENDENCE for 500 feet with the least restrictive device within 7 day(s). (4.)Mr. Black will verbalize 3/3 post-op spinal precautions with INDEPENDENCE within 7 day(s). (5.)Mr. Black will ascend and descend 5 stairs using B hand rail(s) with SUPERVISION to improve functional mobility and safety within 7 day(s). ________________________________________________________________________________________________      PHYSICAL THERAPY: Daily Note, Treatment Day: 2nd and PM 6/22/2017  INPATIENT: Hospital Day: 3  Payor: CARE IMPROVEMENT PLUS [place] / Product Type: Managed Care Medicare /    Spinal precautions and brace     NAME/AGE/GENDER: Kp Tobias is a 64 y.o. 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 ASSESSMENT:      Mr. Megan Diaz is a 64 y.o. male s/p above who was supine in bed on arrival.  Pt reports that he lives with his wife in a one story house with 4-5 steps to enter. He recalled 3/3 precautions.  He rolled to side and sat up and then he was leaning to his right and posteriorly but not as heavily as this AM. Brace was donned with assist from his son. Once brace was applied pt stood and ambulated ~ 250' with RW. Returned to chair in room with needs in reach. Pt left in chair with family. Less impulsive today. Progress made with amount of assist needed with gt and transfers and in gt distance. Will continue efforts. This section established at most recent assessment   PROBLEM LIST (Impairments causing functional limitations):  1. Decreased Strength  2. Decreased Transfer Abilities  3. Decreased Ambulation Ability/Technique  4. Decreased Balance  5. Decreased Activity Tolerance    INTERVENTIONS PLANNED: (Benefits and precautions of physical therapy have been discussed with the patient.)  1. Balance Exercise  2. Bed Mobility  3. Family Education  4. Gait Training  5. Neuromuscular Re-education/Strengthening  6. Therapeutic Activites  7. Therapeutic Exercise/Strengthening  8. Transfer Training  9. Group Therapy      TREATMENT PLAN: Frequency/Duration: twice daily for duration of hospital stay  Rehabilitation Potential For Stated Goals: GOOD      RECOMMENDED REHABILITATION/EQUIPMENT: (at time of discharge pending progress): Continue Skilled Therapy. HISTORY:   History of Present Injury/Illness (Reason for Referral):  S/P L3-L5 LAMI FUSION SPINE TRANSFORAMINAL LUMBAR INTERBODY FUSION (TLIF) REMOVAL OF SYNOVIAL CYST L4 (N/A)  Past Medical History/Comorbidities:   Mr. Cesar Cisneros  has a past medical history of Back problem; Chronic pain; Chronic systolic heart failure ([facility] [address] Circulation problem; Colon polyps; Coronary artery disease (08/2010); Gout; Hyperlipidemia; Hypertension; Obesity (BMI 30-39.9); Pain in joint, multiple sites ( ); rheumatoid Arthritis; S/P CABG x 3; and SVT (supraventricular tachycardia) ([facility]) ( ).  He also has no past medical history of Difficult intubation; Malignant hyperthermia due to anesthesia; Nausea & vomiting; Pseudocholinesterase deficiency; or Unspecified adverse effect of anesthesia. Mr. Grey Sarabia  has a past surgical history that includes colonoscopy; coronary artery bypass graft (2012); cyst removal; orthopaedic; knee arthroscopy (Bilateral); cardiac surg procedure unlist (08/2010); cataract removal (Left, 11/15/2016); and cataract removal (Right, 11/25/2016). Social History/Living Environment:   Home Environment: Private residence  # Steps to Enter: 5  One/Two Story Residence: One story  Living Alone: No  Support Systems: Spouse/Significant Other/Partner  Patient Expects to be Discharged to[de-identified] Private residence  Current DME Used/Available at Home: Bonnita Dire, straight, Walker, rolling  Tub or Shower Type: Shower  Prior Level of Function/Work/Activity:  See above      Number of Personal Factors/Comorbidities that affect the Plan of Care:  CHF  Gout  RA  Chronic pain    3+: HIGH COMPLEXITY   EXAMINATION:   Most Recent Physical Functioning:   Gross Assessment:                  Posture:  Posture (WDL): Exceptions to WDL  Posture Assessment: Forward head  Balance:  Sitting: Impaired  Sitting - Static: Good (unsupported)  Sitting - Dynamic: Fair (occasional)  Standing: Impaired  Standing - Static: Fair  Standing - Dynamic : Fair Bed Mobility:  Rolling: Minimum assistance  Supine to Sit: Minimum assistance  Wheelchair Mobility:     Transfers:  Sit to Stand: Minimum assistance  Stand to Sit: Minimum assistance  Gait:     Speed/Lynne: Slow  Step Length: Left shortened;Right shortened  Distance (ft): 250 Feet (ft)  Assistive Device: Walker, rolling  Ambulation - Level of Assistance: Contact guard assistance  Interventions: Safety awareness training; Tactile cues; Verbal cues       Body Structures Involved:  1. Nerves  2. Heart  3. Lungs  4. Bones  5. Muscles Body Functions Affected:  1. Sensory/Pain  2. Cardio  3. Respiratory  4. Neuromusculoskeletal  5.  Movement Related Activities and Participation Affected:  1. General Tasks and Demands  2. Mobility  3. Domestic Life  4. Community, Social and [place]   Number of elements that affect the Plan of Care: 4+: HIGH COMPLEXITY   CLINICAL PRESENTATION:   Presentation: Evolving clinical presentation with changing clinical characteristics: MODERATE COMPLEXITY   CLINICAL DECISION MAKIN [facility] Mobility Inpatient Short Form  How much difficulty does the patient currently have. .. Unable A Lot A Little None   1. Turning over in bed (including adjusting bedclothes, sheets and blankets)? [ ] 1   [ ] 2   [X] 3   [ ] 4   2. Sitting down on and standing up from a chair with arms ( e.g., wheelchair, bedside commode, etc.)   [ ] 1   [ ] 2   [X] 3   [ ] 4   3. Moving from lying on back to sitting on the side of the bed? [ ] 1   [ ] 2   [X] 3   [ ] 4   How much help from another person does the patient currently need. .. Total A Lot A Little None   4. Moving to and from a bed to a chair (including a wheelchair)? [ ] 1   [ ] 2   [X] 3   [ ] 4   5. Need to walk in hospital room? [ ] 1   [X] 2   [ ] 3   [ ] 4   6. Climbing 3-5 steps with a railing? [ ] 1   [X] 2   [ ] 3   [ ] 4   © , Trustees of [address] [place] [address] [address] [address], under license to [employer]. All rights reserved    Score:  Initial: 16 Most Recent: X (Date: -- )     Interpretation of Tool:  Represents activities that are increasingly more difficult (i.e. Bed mobility, Transfers, Gait).        Score 24 23 22-20 19-15 14-10 9-7 6       Modifier CH CI CJ CK CL CM CN         · Mobility - Walking and Moving Around:               - CURRENT STATUS:    CK - 40%-59% impaired, limited or restricted               - GOAL STATUS:           CJ - 20%-39% impaired, limited or restricted               - D/C STATUS:                       ---------------To be determined---------------  Payor: CARE IMPROVEMENT PLUS / [place] / Product Type: Managed Care Medicare /       Medical Necessity:     · Patient demonstrates good rehab potential due to higher previous functional level. Reason for Services/Other Comments:  · Patient continues to require skilled intervention due to decreased balance, functional mobility, and activity tolerance. Use of outcome tool(s) and clinical judgement create a POC that gives a: Questionable prediction of patient's progress: MODERATE COMPLEXITY                 TREATMENT:   (In addition to Assessment/Re-Assessment sessions the following treatments were rendered)   Pre-treatment Symptoms/Complaints:  Back pain  Pain: Initial:   Pain Intensity 1: 3  Post Session:  3/10      Therapeutic Activity: (    23 min): Therapeutic activities including bed mobility, chair transfers, education and gt on level surface to improve mobility, strength and balance. Required Safety awareness training; Tactile cues; Verbal cues to promote dynamic balance in standing. Braces/Orthotics/Lines/Etc:   · drain (hemovac)   · Room air  Treatment/Session Assessment:    · Response to Treatment:  Pt tolerated treatment better. · Interdisciplinary Collaboration:  · Physical Therapy Assistant and Registered Nurse  · After treatment position/precautions:  · Up in chair  · Call light within reach  · RN notified  · Family at bedside  · Compliance with Program/Exercises: Will assess as treatment progresses. · Recommendations/Intent for next treatment session: \"Next visit will focus on advancements to more challenging activities and reduction in assistance provided\".   Total Treatment Duration  :PT Patient Time In/Time Out  Time In: 1413  Time Out: [address] [facility] Nahomi, PTA

## 2017-06-23 VITALS
DIASTOLIC BLOOD PRESSURE: 79 MMHG | WEIGHT: 301.7 LBS | HEART RATE: 96 BPM | OXYGEN SATURATION: 96 % | SYSTOLIC BLOOD PRESSURE: 139 MMHG | HEIGHT: 76 IN | TEMPERATURE: 97.7 F | BODY MASS INDEX: 36.74 KG/M2 | RESPIRATION RATE: 18 BRPM

## 2017-06-23 PROCEDURE — 74011250637 HC RX REV CODE- 250/637: Performed by: HOSPITALIST

## 2017-06-23 PROCEDURE — 74011250637 HC RX REV CODE- 250/637: Performed by: ANESTHESIOLOGY

## 2017-06-23 PROCEDURE — 74011250637 HC RX REV CODE- 250/637: Performed by: ORTHOPAEDIC SURGERY

## 2017-06-23 PROCEDURE — 97530 THERAPEUTIC ACTIVITIES: CPT

## 2017-06-23 RX ADMIN — ASPIRIN 81 MG 81 MG: 81 TABLET ORAL at 09:00

## 2017-06-23 RX ADMIN — OXYCODONE HYDROCHLORIDE AND ACETAMINOPHEN 250 MG: 500 TABLET ORAL at 09:00

## 2017-06-23 RX ADMIN — FAMOTIDINE 20 MG: 20 TABLET ORAL at 09:00

## 2017-06-23 RX ADMIN — MAGNESIUM HYDROXIDE 30 ML: 400 SUSPENSION ORAL at 09:00

## 2017-06-23 RX ADMIN — FERROUS SULFATE TAB 325 MG (65 MG ELEMENTAL FE) 325 MG: 325 (65 FE) TAB at 08:00

## 2017-06-23 RX ADMIN — GABAPENTIN 600 MG: 300 CAPSULE ORAL at 09:00

## 2017-06-23 RX ADMIN — Medication 10 ML: at 06:13

## 2017-06-23 NOTE — DISCHARGE INSTRUCTIONS
Discharge Instructions - Lumbar Fusion    Incision  Please have someone check your incisions daily. If any of the following should occur, please call the office:   Drainage from incision site   Opening of incisions   Fevers greater than 101 degrees   Flu-like symptoms   Increased redness and/or tenderness  If you have staples or sutures instead of tape on your incision, they may be removed 10-14 days following your surgery. This may be done by a visiting nurse, rehab physician, or at your first follow up visit at our office. Otherwise, if your wound is covered with tape strips, they will typically fall of with showering. Brace  If a brace is prescribed, wear it at all times except for sleeping and showering. Always wear a t-shirt under your brace so that it is not directly in contact with your bare skin. The brace may cause you to sweat and you may feel warm. This can irritate your skin so pay special attention to the incision. Showering  If your surgeon allows, you may shower as normal once the large, bulky bandages are removed from your incision. If they are not removed before your discharge from the hospital, you may remove them 36-48 hours after your surgery. Hair washing is permissible while in the shower. No tub baths, hot tubs or whirlpools until seen in the office. You may remove your brace for showering. Exercise  Lift only objects weighing less than 10-15 lbs. Do not bend or twist at the waist. Always bend with your knees! Limit your sitting to 20-30 minute intervals. You should lie down or walk in between sitting periods. There are no limitations for sitting in a recliner chair. Walk as much as possible and let discomfort. Pain  Take pain medicine as prescribed. As your pain level decreases, you may begin to take over-the-counter Extra-Strength Tylenol. Do NOT take any anti-inflammatory (Advil, Aleve, Motrin) medications for 10 weeks after surgery.  Do not resume taking Fosamax for eight weeks after your fusion surgery. Driving  You may NOT drive a car until told otherwise by your physician. You may be a passenger for short distances (about 20-30 minutes). If you must take a longer trip, be sure to make several pit stops so that you can walk and stretch your legs. Reclining in the passenger seat seems to be the most comfortable position for most patients. Follow-Up Appointments  When you are discharged from the hospital, a follow up appointment will be made for 2-3 weeks from your surgery date. Call [phone] to confirm your appointment. If you have additional questions or concerns, call our office ([address] [place] [address]) [phone]. DISCHARGE SUMMARY from Nurse    The following personal items are in your possession at time of discharge:    Dental Appliances: Lowers, Partials, Uppers  Visual Aid: Glasses  Hearing Aids/Status: Does not own  Home Medications: None  Jewelry: None  Clothing: Footwear, Pants, Shirt, Undergarments  Other Valuables: Eyeglasses             PATIENT INSTRUCTIONS:    After general anesthesia or intravenous sedation, for 24 hours or while taking prescription Narcotics:  · Limit your activities  · Do not drive and operate hazardous machinery  · Do not make important personal or business decisions  · Do  not drink alcoholic beverages  · If you have not urinated within 8 hours after discharge, please contact your surgeon on call. Report the following to your surgeon:  · Excessive pain, swelling, redness or odor of or around the surgical area  · Temperature over 100.5  · Nausea and vomiting lasting longer than 4 hours or if unable to take medications  · Any signs of decreased circulation or nerve impairment to extremity: change in color, persistent  numbness, tingling, coldness or increase pain  · Any questions        What to do at Home:  Recommended activity: See surgical instructions, diet as tolerated.      *  Please give a list of your current medications to your Primary Care Provider. *  Please update this list whenever your medications are discontinued, doses are      changed, or new medications (including over-the-counter products) are added. *  Please carry medication information at all times in case of emergency situations. These are general instructions for a healthy lifestyle:    No smoking/ No tobacco products/ Avoid exposure to second hand smoke    Surgeon General's Warning:  Quitting smoking now greatly reduces serious risk to your health. Obesity, smoking, and sedentary lifestyle greatly increases your risk for illness    A healthy diet, regular physical exercise & weight monitoring are important for maintaining a healthy lifestyle    You may be retaining fluid if you have a history of heart failure or if you experience any of the following symptoms:  Weight gain of 3 pounds or more overnight or 5 pounds in a week, increased swelling in our hands or feet or shortness of breath while lying flat in bed. Please call your doctor as soon as you notice any of these symptoms; do not wait until your next office visit. Recognize signs and symptoms of STROKE:    F-face looks uneven    A-arms unable to move or move unevenly    S-speech slurred or non-existent    T-time-call 911 as soon as signs and symptoms begin-DO NOT go       Back to bed or wait to see if you get better-TIME IS BRAIN. Warning Signs of HEART ATTACK     Call 911 if you have these symptoms:   Chest discomfort. Most heart attacks involve discomfort in the center of the chest that lasts more than a few minutes, or that goes away and comes back. It can feel like uncomfortable pressure, squeezing, fullness, or pain.  Discomfort in other areas of the upper body. Symptoms can include pain or discomfort in one or both arms, the back, neck, jaw, or stomach.  Shortness of breath with or without chest discomfort.    Other signs may include breaking out in a cold sweat, nausea, or lightheadedness. Don't wait more than five minutes to call 911 - MINUTES MATTER! Fast action can save your life. Calling 911 is almost always the fastest way to get lifesaving treatment. Emergency Medical Services staff can begin treatment when they arrive -- up to an hour sooner than if someone gets to the hospital by car. The discharge information has been reviewed with the patient. The patient verbalized understanding. Discharge medications reviewed with the patient and appropriate educational materials and side effects teaching were provided.

## 2017-06-23 NOTE — OP NOTES
[facility]   OPERATIVE REPORT       Name:  Catina Pina   MR#:  [id number]   :  1940   Account #:  [de-identified]   Date of Adm:  2017       DATE OF SURGERY: 2017    ATTENDING PHYSICIAN: Gallito Adams MD    ASSISTANT: ROLAN Marx    PREOPERATIVE DIAGNOSIS: L4-5 and L5-S1 spinal stenosis with   spondylolisthesis. POSTOPERATIVE DIAGNOSIS: L4-5 and L5-S1 spinal stenosis with   spondylolisthesis with the left sided L4 intraspinal extradural   lesion, compressing the thecal sac and nerve root. PROCEDURE:   1. L4 laminectomy for excision of intraspinal extradural lesion. 2. L5 and S1 laminectomy for decompression of the spinal   stenosis centrally and lateral recess. 3. L4-5 and L5-S1 posterior lumbar interbody fusion. 4. Placement of biomechanical interbody device at L4-5 and L5-S1   using the NextMedium biomechanical implant. 5. Placement of segmental spinal instrumentation L4 to S1 using   the NextMedium cortical screw and mireya system. 6. L4-S1 posterolateral fusion using local bone graft, OsteoAMP   and demineralized bone matrix. 7. Left iliac crest bone marrow aspiration. ANESTHESIA: GETA. ESTIMATED BLOOD LOSS: 1200 mL. FLUIDS: 3400 mL crystalloid, 500 mL albumin. DRAINS: One Hemovac. SPECIMENS: Left sided L4 intraspinal extradural lesion sent to   Pathology for permanent sectioning. HISTORY OF PRESENT ILLNESS: The patient is a very large, 64  year-old gentleman who has had progressively worsening back and   leg pain, left leg pain is worse than the right. He has gotten   to the point where he cannot ambulate. He had been declined   surgery by other surgeons, but he had clear-cut pathology. He   failed to get long-term improvement with epidural blocks, pain   medication, activity restriction, so he was brought for surgical   treatment. PROCEDURE: The patient was brought to the operating room.  After   administration of anesthesia, IV antibiotics and placement of   monitoring lines, he was positioned prone on the [place] frame. His back was prepped with Betadine and sterilely draped. At this   point, surgeon called a timeout and confirmed the procedure to   be performed, his allergy history and the fact that he had   received preoperative IV antibiotics. In addition, it should be   noted that an assistant was medically necessary for this case   because of the extensive soft tissue and neural retraction   involved. C-arm was brought in. We identified the L4-5 and L5-S1   level, marked them on the skin, made a midline incision over   this, dissected down through the subcutaneous tissue with   electrocautery to the deep fascia, then incised on either sides   of the spinous processes and dissected down along the lamina out   to the facet joint bilaterally. The patient was a very large   gentleman, very deep hole to dissect down to. Once we had done   that, we placed an angled curette in the interlaminar space,   took a lateral C-arm x-ray to confirm our location. We then   stripped out the lateral gutter, exposing the L4-5 and   transverse processes and the sacral ala bilaterally and stripped   down to the membrane in between. There was steady venous bleeding   throughout the procedure despite cauterizing the bleeders, this   was probably because he had a large muscle soft tissue exposed. Once we had stripped out, we removed the spinous processes of   L4, L5, and S1 with a rongeur and saved this for local bone   graft. We then burred down the lamina of L4, L5 and S1 with a   high-speed bur. We scraped the ligamentum off of the lamina at   each level bilaterally with an angled curette and then used a   Kerrison punch to perform a bilateral laminectomy at L5 and S1   for central, and lateral recess stenosis at L4, there was a   large intraspinal extradural lesion on the left.  We did a   laminectomy at L4 to access this and removed the lesion, which   was difficult adherent to the dura and the nerve root. Eventually   this was removed and sent to Pathology. We had to perform   bilateral foraminotomies at every level bilaterally because of   stenosis and we cauterized multiple epidural bleeders with the   bipolar and to further open the foramen, we elected to place   interbody devices at both levels. We placed these from the left   side. We retracted the neural structures medially at each side,   cauterized the epidural veins, incised the disk annulus, removed   disk with pituitaries and then used the distractors to distract   and the jose alberto to shave out the back of the endplate. We used   angled curettes to remove further disk off of the endplates and   then removed this with pituitaries. We trialed with appropriate   trials and I believe 12 mm trials had the appropriate fit at   each level and 12 mm biomechanical interbody devices were   selected, 2 of these and these were packed full of local bone   graft. We also packed local bone graft into the anterior aspect   of the disk space at each level and then we inserted a 12 mm   sloped biomechanical interbody device at L4-5 and L5-S1 from the   left side, tamped them anteriorly and across the midline with   the neural structures retracted. Once we had done that, we   thoroughly irrigated the wound and then we were ready to place   our spinal instrumentation. We identified the pedicles at L4-5   and S1 bilaterally and then we drilled the starting hole for   cortical screws at approximately the 7 o'clock position of the   pedicles on the right side and the 5 o'clock position for the   left side and with a 4.5 mm tap, we drilled the cortical screws,   probed them, no breaches were found, tapped them with a 5.5 mm   tap, reprobed it and no breaches were found and we inserted 5.5   length cortical screws, 35 and 40 mm in length at each level.  We   then took rods of appropriate length, fashioned to fit into the screws, the locking nuts were placed and then these were   tightened with the torque wrench. AP and lateral C-arm x-ray   were obtained, which showed very good positioning of the   cortical screws and good alignment of spine and good placement   of the interbody devices anteriorly with good lordosis of the   spine. We irrigated the wound again. Then we decorticated the   lateral bony facet joints and the transverse processes and   sacral ala with a high-speed bur bilaterally. We had harvested   bone marrow aspirate from the left iliac crest by inserting the   needle in the crest and suctioning out 10 mL of bone marrow   aspirate which was mixed with 20 mL of OsteoAMP. After   decorticating the lateral gutters, we placed half of this bone   marrow and OsteoAMP in each lateral gutter and then we placed   demineralized bone matrix on top of this, pressed it into place   and then we were ready to close the wound. A Hemovac drain was   placed deep in the wound and brought out through a separate   proximal stab incision. Vancomycin powder was placed in the   lateral gutters and dorsal spine. We placed FloSeal over the   laminectomy defect for hemostasis and then we closed the wound. The deep fascia was initially closed with interrupted figure-of-  eight stitches of #1 Vicryl. Subcutaneous tissue was closed over   this with interrupted stitches of 2-0 Vicryl and the skin was   closed with a running subcuticular stitch of 3-0 Vicryl. Steri-  Strips and dry sterile dressings were applied. The patient was   then rolled supine onto the recovery room bed, having tolerated   the procedure well.         MD ADRIENNE Juan / YOAN   D:  06/22/2017   17:43   T:  06/23/2017   07:17   Job #:  [id number]

## 2017-06-23 NOTE — PROGRESS NOTES
Problem: Mobility Impaired (Adult and Pediatric)  Goal: *Acute Goals and Plan of Care (Insert Text)  LTG:  (1.)Mr. Black will move from supine to sit and sit to supine and roll side to side, using log roll technique, with MODIFIED INDEPENDENCE within 7 day(s). (2.)Mr. Black will transfer from bed to chair and chair to bed with MODIFIED INDEPENDENCE using the least restrictive device within 7 day(s). (3.)Mr. Black will ambulate with MODIFIED INDEPENDENCE for 500 feet with the least restrictive device within 7 day(s). (4.)Mr. Black will verbalize 3/3 post-op spinal precautions with INDEPENDENCE within 7 day(s). (5.)Mr. Black will ascend and descend 5 stairs using B hand rail(s) with SUPERVISION to improve functional mobility and safety within 7 day(s). ________________________________________________________________________________________________      PHYSICAL THERAPY: Daily Note, Treatment Day: 3rd and AM 6/23/2017  INPATIENT: Hospital Day: 4  Payor: CARE IMPROVEMENT PLUS / [place] / Product Type: Managed Care Medicare /    Spinal precautions and brace     NAME/AGE/GENDER: Bertram Alfaro is a 64 y.o. male      PRIMARY DIAGNOSIS: Lumbar stenosis [M48.06]  Spondylolisthesis of lumbar region [M43.16] Spinal stenosis of lumbar region Spinal stenosis of lumbar region  Procedure(s) (LRB):  L3-L5 LAMI FUSION SPINE TRANSFORAMINAL LUMBAR INTERBODY FUSION (TLIF) REMOVAL OF SYNOVIAL CYST L4 (N/A)  3 Days Post-Op  ICD-10: Treatment Diagnosis:       · Generalized Muscle Weakness (M62.81)  · Difficulty in walking, Not elsewhere classified (R26.2)  · Other abnormalities of gait and mobility (R26.89)   Precaution/Allergies:  Review of patient's allergies indicates no known allergies. ASSESSMENT:      Mr. Anand Stein is a 64 y.o. male s/p above who was sitting on EOB on arrival.  Pt reports that he lives with his wife in a one story house with 4-5 steps to enter. He recalled 3/3 precautions.  [place] was donned with assist from his son. Once brace was applied pt was instructed to stand by pushing up with one hand on bed. Pt wanted PTA and his wife to hold walker down while he pulled up on it. Pt was educated on why this was unsafe but he wants to do things his way. He stood and ambulated ~ 150' with RW into the gym and ascended and descended 5 steps x 2 using bilateral handrails. Ambulated ~150' in like manner back to room. Returned to EOB in room with needs in reach. Still impulsive. Progress made with amount of assist needed with gt and transfers and in gt distance. Will continue efforts. This section established at most recent assessment   PROBLEM LIST (Impairments causing functional limitations):  1. Decreased Strength  2. Decreased Transfer Abilities  3. Decreased Ambulation Ability/Technique  4. Decreased Balance  5. Decreased Activity Tolerance    INTERVENTIONS PLANNED: (Benefits and precautions of physical therapy have been discussed with the patient.)  1. Balance Exercise  2. Bed Mobility  3. Family Education  4. Gait Training  5. Neuromuscular Re-education/Strengthening  6. Therapeutic Activites  7. Therapeutic Exercise/Strengthening  8. Transfer Training  9. Group Therapy      TREATMENT PLAN: Frequency/Duration: twice daily for duration of hospital stay  Rehabilitation Potential For Stated Goals: GOOD      RECOMMENDED REHABILITATION/EQUIPMENT: (at time of discharge pending progress): Continue Skilled Therapy. HISTORY:   History of Present Injury/Illness (Reason for Referral):  S/P L3-L5 LAMI FUSION SPINE TRANSFORAMINAL LUMBAR INTERBODY FUSION (TLIF) REMOVAL OF SYNOVIAL CYST L4 (N/A)  Past Medical History/Comorbidities:   Mr. Jumana Solo  has a past medical history of Back problem; Chronic pain; Chronic systolic heart failure ([facility] [address] Circulation problem; Colon polyps; Coronary artery disease (08/2010); Gout; Hyperlipidemia; Hypertension; Obesity (BMI 30-39.9);  Pain in joint, multiple sites ( ); rheumatoid Arthritis; S/P CABG x 3; and SVT (supraventricular tachycardia) (HCC) ( ). He also has no past medical history of Difficult intubation; Malignant hyperthermia due to anesthesia; Nausea & vomiting; Pseudocholinesterase deficiency; or Unspecified adverse effect of anesthesia. Mr. Elva Dorado  has a past surgical history that includes colonoscopy; coronary artery bypass graft (2012); cyst removal; orthopaedic; knee arthroscopy (Bilateral); cardiac surg procedure unlist (08/2010); cataract removal (Left, 11/15/2016); and cataract removal (Right, 11/25/2016). Social History/Living Environment:   Home Environment: Private residence  # Steps to Enter: 5  One/Two Story Residence: One story  Living Alone: No  Support Systems: Spouse/Significant Other/Partner  Patient Expects to be Discharged to[de-identified] Private residence  Current DME Used/Available at Home: [employer], straight, Walker, rolling  Tub or Shower Type: Shower  Prior Level of Function/Work/Activity:  See above      Number of Personal Factors/Comorbidities that affect the Plan of Care:  CHF  Gout  RA  Chronic pain    3+: HIGH COMPLEXITY   EXAMINATION:   Most Recent Physical Functioning:   Gross Assessment:                  Posture:  Posture (WDL): Exceptions to WDL  Posture Assessment: Forward head  Balance:  Sitting: Intact  Sitting - Static: Good (unsupported)  Sitting - Dynamic: Good (unsupported)  Standing: Impaired  Standing - Static: Good (-)  Standing - Dynamic : Fair (+) Bed Mobility:     Wheelchair Mobility:     Transfers:  Sit to Stand: Minimum assistance  Stand to Sit: Minimum assistance  Gait:     Speed/Lynne: Slow  Step Length: Left shortened;Right shortened  Distance (ft): 300 Feet (ft)  Assistive Device: Walker, rolling  Ambulation - Level of Assistance: Stand-by asssistance  Interventions: Safety awareness training;Verbal cues       Body Structures Involved:  1. Nerves  2. Heart  3. Lungs  4. Bones  5.  Muscles Body Functions Affected:  1. Sensory/Pain  2. Cardio  3. Respiratory  4. Neuromusculoskeletal  5. Movement Related Activities and Participation Affected:  1. General Tasks and Demands  2. Mobility  3. Domestic Life  4. Community, Social and [place]   Number of elements that affect the Plan of Care: 4+: HIGH COMPLEXITY   CLINICAL PRESENTATION:   Presentation: Evolving clinical presentation with changing clinical characteristics: MODERATE COMPLEXITY   CLINICAL DECISION MAKIN [facility] Mobility Inpatient Short Form  How much difficulty does the patient currently have. .. Unable A Lot A Little None   1. Turning over in bed (including adjusting bedclothes, sheets and blankets)? [ ] 1   [ ] 2   [X] 3   [ ] 4   2. Sitting down on and standing up from a chair with arms ( e.g., wheelchair, bedside commode, etc.)   [ ] 1   [ ] 2   [X] 3   [ ] 4   3. Moving from lying on back to sitting on the side of the bed? [ ] 1   [ ] 2   [X] 3   [ ] 4   How much help from another person does the patient currently need. .. Total A Lot A Little None   4. Moving to and from a bed to a chair (including a wheelchair)? [ ] 1   [ ] 2   [X] 3   [ ] 4   5. Need to walk in hospital room? [ ] 1   [X] 2   [ ] 3   [ ] 4   6. Climbing 3-5 steps with a railing? [ ] 1   [X] 2   [ ] 3   [ ] 4   © , Trustees of [address] [place] [address], under license to "[employer]. All rights reserved    Score:  Initial: 16 Most Recent: X (Date: -- )     Interpretation of Tool:  Represents activities that are increasingly more difficult (i.e. Bed mobility, Transfers, Gait).        Score 24 23 22-20 19-15 14-10 9-7 6       Modifier CH CI CJ CK CL CM CN         · Mobility - Walking and Moving Around:               - CURRENT STATUS:    CK - 40%-59% impaired, limited or restricted               - GOAL STATUS:           CJ - 20%-39% impaired, limited or restricted               - D/C STATUS: ---------------To be determined---------------  Payor: CARE IMPROVEMENT PLUS / [place]PR[place] / Product Type: Managed Care Medicare /       Medical Necessity:     · Patient demonstrates good rehab potential due to higher previous functional level. Reason for Services/Other Comments:  · Patient continues to require skilled intervention due to decreased balance, functional mobility, and activity tolerance. Use of outcome tool(s) and clinical judgement create a POC that gives a: Questionable prediction of patient's progress: MODERATE COMPLEXITY                 TREATMENT:   (In addition to Assessment/Re-Assessment sessions the following treatments were rendered)   Pre-treatment Symptoms/Complaints:  Back pain  Pain: Initial:   Pain Intensity 1: 3  Post Session:  3/10      Therapeutic Activity: (    17 min): Therapeutic activities including bed mobility, education, stairs and gt on level surface to improve mobility, strength and balance. Required Safety awareness training;Verbal cues to promote dynamic balance in standing. Braces/Orthotics/Lines/Etc:   ·   · Room air  Treatment/Session Assessment:    · Response to Treatment:  Pt tolerated treatment better. · Interdisciplinary Collaboration:  · Physical Therapy Assistant and Registered Nurse  · After treatment position/precautions:  · Call light within reach, RN notified, Family at bedside and EOB  · Compliance with Program/Exercises: Will assess as treatment progresses. · Recommendations/Intent for next treatment session: \"Next visit will focus on advancements to more challenging activities and reduction in assistance provided\".   Total Treatment Duration  :PT Patient Time In/Time Out  Time In: 0931  Time Out: 1391 Sai Comer PTA

## 2017-06-23 NOTE — PROGRESS NOTES
Discharge instructions and prescriptions given and explained to pt. Pt verbalized understanding. Medication side effects sheet reviewed with pt. Pt to be discharged home, after drain is removed and dressing changed.

## 2017-06-23 NOTE — PROGRESS NOTES
ORTHO PROGRESS NOTE    2017    Admit Date: 2017  Admit Diagnosis: Lumbar stenosis [M48.06]  Spondylolisthesis of lumbar region [M43.16]  Post Op day: 3 Days Post-Op      Subjective:     Mitali Ni is a patient who is now 3 Days Post-Op  and has no complaints. Sitting up in chair. \"ready to go home\"       Objective:     PT/OT: satisfactory        Vital Signs:    Patient Vitals for the past 8 hrs:   BP Temp Pulse Resp SpO2   17 0728 - 97.7 °F (36.5 °C) 96 18 96 %   17 0423 139/79 98.6 °F (37 °C) 95 18 95 %     Temp (24hrs), Av.4 °F (36.9 °C), Min:97.7 °F (36.5 °C), Max:99.1 °F (37.3 °C)      LAB:    Recent Labs      17   1447   HGB  10.4*   WBC  13.8*   PLT  244       I/O:      1901 -  0700  In: -   Out: 1 [Urine:450; Drains:520]    Physical Exam:    Awake and in no acute distress. Mood and affect appropriate. Respirations unlabored and no evidence cyanosis. Calves nontender. Abdomen soft and nontender. Dressing clean/dry  No new neurologic deficit. Assessment:      Patient Active Problem List   Diagnosis Code    Coronary artery disease I25.10    History of coronary artery bypass graft x 3 Z95.1    Hypercholesterolemia E78.00    BPH (benign prostatic hypertrophy) N40.0    Neuropathy, peripheral (HCC) G62.9    Gout M10.9    Hypertension I10    Rheumatoid arthritis of hand ([facility] [address] M06.9    SVT [facility]supraventricular tachycardia) ([facility]) I47.1    Spinal stenosis of lumbar region M48.06    Obesity (BMI 30-39. 9) E66.9    Spondylolisthesis of lumbar region M43.16    Spinal stenosis, lumbar region, with neurogenic claudication M48.06    Hypotension I95.9    Acute blood loss anemia D62       3 Days Post-Op STATUS POST Procedure(s):  L3-L5 LAMI FUSION SPINE TRANSFORAMINAL LUMBAR INTERBODY FUSION (TLIF) REMOVAL OF SYNOVIAL CYST L4      Plan:     Continue PT/OT/Rehab  Discontinue: IV and drain    Consult: none  Anticipate discharge to: HOME today after PT Signed By: Skinny Spangler NP

## 2017-08-15 ENCOUNTER — HOSPITAL ENCOUNTER (OUTPATIENT)
Dept: PHYSICAL THERAPY | Age: 77
Discharge: HOME | End: 2017-08-15
Payer: MEDICARE

## 2017-08-15 PROCEDURE — 97161 PT EVAL LOW COMPLEX 20 MIN: CPT | Performed by: PHYSICAL THERAPIST

## 2017-08-15 PROCEDURE — G8979 MOBILITY GOAL STATUS: HCPCS | Performed by: PHYSICAL THERAPIST

## 2017-08-15 PROCEDURE — 97110 THERAPEUTIC EXERCISES: CPT | Performed by: PHYSICAL THERAPIST

## 2017-08-15 PROCEDURE — G8978 MOBILITY CURRENT STATUS: HCPCS | Performed by: PHYSICAL THERAPIST

## 2017-08-15 NOTE — PROGRESS NOTES
Chepe Black  : 1940 [address] at [address] [facility] [address], [facility], [address]  [phone]   [phone]         OUTPATIENT PHYSICAL THERAPY:Initial Assessment 8/15/2017    ICD-10: Treatment Diagnosis: (M43. 26) TLIF L3-L5; (M62.81) muscle weakness; (R26.2) Difficulty walking  Precautions/Allergies:   Review of patient's allergies indicates no known allergies. Fall Risk Score: 1 (? 5 = High Risk)  MD Orders: Evaluate and Treat; Modalities, Core strengthening and stretching MEDICAL/REFERRING DIAGNOSIS:  back Z98.1 Arthodesis status  DATE OF ONSET: 17  REFERRING PHYSICIAN: Nohemy Swan MD  RETURN PHYSICIAN APPOINTMENT: 17     INITIAL ASSESSMENT:  Mr. Naty Mcwilliams presents today 7 weeks s/p TLIF L3-L5 and synovial cyst removal at L4 progressing as anticipated. He demonstrates decreased lumbar AROM, as well as, decreased core and LE strength making gait antalgic. He c/o localized lumbar and buttock discomfort and numbness/tingling from the surgery. Pt will benefit from skilled PT to address the following deficits. PROBLEM LIST (Impacting functional limitations):  1. Decreased Strength  2. Decreased ADL/Functional Activities  3. Decreased Transfer Abilities  4. Decreased Ambulation Ability/Technique  5. Decreased Balance  6. Increased Pain  7. Decreased Activity Tolerance  8. Increased Fatigue  9. Decreased Flexibility/Joint Mobility  10. Decreased Knowledge of Precautions  11. Decreased [place] with Home Exercise Program INTERVENTIONS PLANNED:  1. Balance Exercise  2. Bed Mobility  3. Cold  4. Cryotherapy  5. Electrical Stimulation  6. Family Education  7. Gait Training  8. Heat  9. Home Exercise Program (HEP)  10. Manual Therapy  11. Neuromuscular Re-education/Strengthening  12. Range of Motion (ROM)  13. Therapeutic Activites  14. Therapeutic Exercise/Strengthening  15.  Transcutaneous Electrical Nerve Stimulation (TENS)  16. Kinesiotaping   TREATMENT PLAN:  Effective Dates: 8/15/17 TO 10/15/17. Frequency/Duration: 1-2 times a week for 8 weeks  GOALS: (Goals have been discussed and agreed upon with patient.)  Short-Term Functional Goals: Time Frame: 4 weeks (9-15-17)  1. Pt will be compliant and independent with HEP, log rolling and demonstrating correct, erect posture without an increase in LB pain. 2. Pt will improve Oswestry score to 28/50 to increase pt's overall functional mobility. 3. Pt to subjectively report a decrease in pain to 4-5/10 @ worst to increase pt's sitting, standing and walking tolerance. 4. Pt will improve Lumbar AROM to > Flex: 50%, Ext: 10%; and B SB: 25% to increase pt's overall functional mobility. 5. Pt will be able to sit-stand from std height w/out use of UE or compensatory weight shifting to rise. 6. Pt will increase standing/walking tolerance to > 30min (pt will be able to walk . 5 to 1.0miles) with manageable LB pain. 7. Pt will be able to ascend/descend 8\" stairs reciprocally with use of 1 rail for balance. 8. Pt will ambulate with a near normal gait pattern with LRD or No AD. Discharge Goals: Time Frame: 8 weeks (10-15-17)  1. Pt will improve Oswestry score to 20/50 to increase pt's overall functional mobility. 2. Pt will improve FLR from  CL to G 8979 CK to increase pt's overall functional mobility. 3. Pt will subjectively report a decrease in pain to 3/10 @ worst to allow pt to ambulate with a normal gait pattern and increase their overall functional mobility. 4. Pt will improve Lumbar AROM to > Flex: 75%; Ext: 20%: And B SB 50% to increase pt's overall functional mobility. 5. Pt will ambulate with a normal gait pattern with no assistive device. 6. Pt will be discharged from PT to [facility].   Rehabilitation Potential For Stated Goals: Good  Regarding Edson Black's therapy, I certify that the treatment plan above will be carried out by a therapist or under their direction. Thank you for this referral,  David Tovar PT     Referring Physician Signature: Felisha Hall MD              Date                    The information in this section was collected on 8/15/17 (except where otherwise noted). HISTORY:   History of Present Injury/Illness (Reason for Referral):  Pt reports he's had back pain for > 3 years. He states he tried GUMARO injections and PT, but nothing helped. He states he was evluated by Dr. Mariaa Smith who determined he needed a TLIF at L3-L5. He states he had sx on 6/20/17, and while Dr. Mariaa Smith was in there, he found a synovial cyst at L4. He states overall, his pain has improved, and he is now wearing a bone stimulator for the next 6-9 months. Past Medical History/Comorbidities:   Mr. Norina Eisenmenger  has a past medical history of Back problem; Chronic pain; Chronic systolic heart failure ([address] Circulation problem; Colon polyps; Coronary artery disease (08/2010); Gout; Hyperlipidemia; Hypertension; Obesity (BMI 30-39.9); Pain in joint, multiple sites ( ); rheumatoid Arthritis; S/P CABG x 3; and SVT (supraventricular tachycardia) (HCC) ( ). He also has no past medical history of Difficult intubation; Malignant hyperthermia due to anesthesia; Nausea & vomiting; Pseudocholinesterase deficiency; or Unspecified adverse effect of anesthesia. Mr. Norina Eisenmenger  has a past surgical history that includes colonoscopy; coronary artery bypass graft (2012); cyst removal; cardiac surg procedure unlist (08/2010); cataract removal (Left, 11/15/2016); cataract removal (Right, 11/25/2016); orthopaedic; knee arthroscopy (Bilateral); and back surgery (06/2017). Pt also had a L RCR in 2015. Social History/Living Environment:     Pt reports he lives in a provate home with his wife with 5 steps to enter. He states he required assistance with ADLs, until this week. He states he moves slowly, but is independent.    Prior Level of Function/Work/Activity:  Pt reports he was completely independent with ADLs, house hold and yard work activities. Dominant Side:         LEFT    Current Medications:       Current Outpatient Prescriptions:     HYDROcodone-homatropine (HYCODAN) 5-1.5 mg/5 mL (5 mL) syrup, Take 5 mL by mouth four (4) times daily. Max Daily Amount: 20 mL., Disp: 100 mL, Rfl: 0    predniSONE (DELTASONE) 10 mg tablet, 2x5d, 1x5d, Disp: 15 Tab, Rfl: 0    gabapentin (NEURONTIN) 600 mg tablet, Take 1 tablet by mouth  daily, Disp: 90 Tab, Rfl: 3    aspirin delayed-release 81 mg tablet, Take 81 mg by mouth daily. , Disp: , Rfl:     allopurinol (ZYLOPRIM) 300 mg tablet, Take 1 Tab by mouth nightly. Indications: URIC ACID NEPHROPATHY GOUT, Disp: 90 Tab, Rfl: 3    atorvastatin (LIPITOR) 10 mg tablet, Take 1 Tab by mouth nightly. Indications: hypercholestermia, Disp: 90 Tab, Rfl: 3    finasteride (PROSCAR) 5 mg tablet, Take 1 Tab by mouth every evening. Indications: BENIGN PROSTATIC HYPERPLASIA, Disp: 90 Tab, Rfl: 3    lisinopril (PRINIVIL, ZESTRIL) 10 mg tablet, Take 1 tab po daily  Indications: hypertension, Disp: 90 Tab, Rfl: 3    meloxicam (MOBIC) 7.5 mg tablet, Take 1 Tab by mouth daily. , Disp: 90 Tab, Rfl: 3    tamsulosin (FLOMAX) 0.4 mg capsule, Take 1 Cap by mouth every evening. Indications: BENIGN PROSTATIC HYPERPLASIA, Disp: 90 Cap, Rfl: 3    omega-3 fatty acids-vitamin e (FISH OIL) 1,000 mg cap, Take 1 Cap by mouth daily. Indications: hold until after surgery, Disp: , Rfl:     METHYL-B12/L-MEFOLATE/B6 PHOS (METANX PO), Take  by mouth two (2) times a day. Indications: energy supplement, Disp: , Rfl:     Cholecalciferol, Vitamin D3, (VITAMIN D3) 1,000 unit cap, Take 2,000 Units by mouth every morning.  Indications: PREVENTION OF VITAMIN D DEFICIENCY, Disp: , Rfl:    Date Last Reviewed:  8/15/17    Number of Personal Factors/Comorbidities that affect the Plan of Care:  0: LOW COMPLEXITY   EXAMINATION:   Observation/Orthostatic Postural Assessment:          Pt stands with slightly L trunk lean and forward flexion. Palpation:          Pt is moderately tender with palpation along his incision, especially at L4-L5. Posture/Deformity:  Lumbar AROM: % of normal motion in standing    Lumbar spine Date:  8/15/17 Date:   Date:     Direction  Parameters Parameters Parameters   Flexion  25%     Extension  5% w/ pain     Rotation  R: NT  L: NT     Side bending  R: 15%  L:15%     Hip R:WFL  L:WFL                   Strength Date:  8/15/17 Date:   Date:     Core/LE Parameters Parameters Parameters   Hip Flex R:3/5  L:3+/5     Hip Ext R:3-/5  L:3-/5     Hip ABD R:3-/5  L:3-/5     Hip ADD R:3/5  L:3+/5     Hip ER R:3-/5  L:3-/5     Knee Ext R:4+/5  L:4+/5     Knee Flex R:4/5  L:4/5     Ankle DF R:4/5  L:4/5     Ankle PF R:4/5  L:4/5     Ankle INV R:3/5  L:3/5     Ankle EV R:3/5  L:3/5     Double Squat (L4) NT due to acuteness of surgery     Heel Walk (L5) NT due to acuteness of surgery     Toe Walk (S1) NT due to acuteness of surgery       Myotomes: All intact; R delayed > than L  Lower Abdominals (L1):  Iliopsoas (L2):  Quadriceps (L3):  Anterior Tibialis (L4):  Extensor Hallicus Longus (L5):  Gastrocnemius (S1):    Sensation: pt reports numbness in B feet from neuropathy rather than nerve root compression from his back. He states it's equal B. Anterior Thigh (L3):  Medial Foot (L4):  Dorsal Foot (L5):  Lateral Foot (S1):        Body Structures Involved:  1. Nerves  2. Bones  3. Joints  4. Muscles  5. Ligaments Body Functions Affected:  1. Sensory/Pain  2. Neuromusculoskeletal  3. Movement Related  Activities and Participation Affected:  1. Mobility  2. Self Care  3. Domestic Life  4. Community, Social and [place]    Number of elements (examined above) that affect the Plan of Care:  1-2: LOW COMPLEXITY   CLINICAL PRESENTATION:    Presentation:  Stable and uncomplicated: LOW COMPLEXITY   CLINICAL DECISION MAKING:   Outcome Measure:    Tool Used: Modified Oswestry Low Back Pain Questionnaire  Score:  Initial: 32/50 =64% disability Most Recent: X/50 (Date: -- )   Interpretation of Score: Each section is scored on a 0-5 scale, 5 representing the greatest disability. The scores of each section are added together for a total score of 50. Score 0 1-10 11-20 21-30 31-40 41-49 50   Modifier CH CI CJ CK CL CM CN     ? Mobility - Walking and Moving Around:     - CURRENT STATUS: CL - 60%-79% impaired, limited or restricted    - GOAL STATUS: CK - 40%-59% impaired, limited or restricted    - D/C STATUS:  ---------------To be determined---------------        Medical Necessity:   · Patient is expected to demonstrate progress in strength, range of motion, balance, coordination and functional technique to decrease assistance required with house old activities , increase independence with HEP and improve safety during gait, walking for exercise and yard work. .  Reason for Services/Other Comments:  · Patient would benefit from skilled PT to improve B LE and core strength, lumbar AROM, balance and gait safety so pt can return to walking for exercise, as well as, shooting basketball. .     Use of outcome tool(s) and clinical judgement create a POC that gives a: Clear prediction of patient's progress: LOW COMPLEXITY            TREATMENT:   (In addition to Assessment/Re-Assessment sessions the following treatments were rendered)  Pre-treatment Symptoms/Complaints:  Pt c/o LBP, tigthness and B LE weakness. Pain: Initial:     7-8/10 Post Session:  6-7/10     THERAPEUTIC EXERCISE: (30 minutes):  Exercises per grid below to improve mobility, strength, balance and coordination. Required moderate visual, verbal and tactile cues to promote proper body alignment, promote proper body posture, promote proper body mechanics and promote proper body breathing techniques. Progressed resistance, range, repetitions and complexity of movement as indicated. Re-assessment was performed today (see above assessment section).   Separate time was dedicated today for this re-assessment. 30min   Date:  8/15/17 Date:   Date:     Activity/Exercise Parameters Parameters Parameters   LTR 10x     SKTC 10x     DKTC 5x     TAs w/ pelvic tilt 10 x 5 sec     TAs w/ add sq 10 x 5 sec           Education HEP 2x/daily; Logrolling; scar massage with cocoa butter to begin immediately           Treatment/Session Assessment: Pt with restricted lumbar AROM, as well as, core trunk and B LE weakness contributing to his antalgic gait and decreased standing/walking tolerance. · Response to Treatment:  Pt did well with above exercises, therefore I added those to his HEP. He c/o discomfort with bridging, therefore we omitted that for today. · Compliance with Program/Exercises: Will assess as treatment progresses. Recommendations/Intent for next treatment session: \"Next visit will focus on advancements to more challenging activities and reduction in assistance provided   \". Future Appointments  Date Time Provider Briana Rojo   8/21/2017 10:00 AM Kian Nicholson PT [facility]   8/25/2017 10:00 AM Kian Nicholson PT [facility]   8/30/2017 10:00 AM Kian Nicholson, PT [facility]   9/5/2017 10:00 AM Kian Nicholson PT [facility]   9/8/2017 11:00 AM Kian Nicholson, PT [facility]   9/25/2017 8:00 AM CHELLY Gallardo MD [facility] DAY[facility] DAYNE     Total Treatment Duration:  PT Patient Time In/Time Out  Time In: 1110  Time Out: [address] [facility] ([place], [place])

## 2017-08-15 NOTE — PROGRESS NOTES
Ambulatory/Rehab Services H2 Model Falls Risk Assessment    Risk Factor Pts. ·   Confusion/Disorientation/Impulsivity  []    4 ·   Symptomatic Depression  []   2 ·   Altered Elimination  []   1 ·   Dizziness/Vertigo  []   1 ·   Gender (Male)  [x]   1 ·   Any administered antiepileptics (anticonvulsants):  []   2 ·   Any administered benzodiazepines:  []   1 ·   Visual Impairment (specify):  []   1 ·   Portable Oxygen Use  []   1 ·   Orthostatic ? BP  []   1 ·   History of Recent Falls (within 3 mos.)  []   5     Ability to Rise from Chair (choose one) Pts. ·   Ability to rise in a single movement  [x]   0 ·   Pushes up, successful in one attempt  []   1 ·   Multiple attempts, but successful  []   3 ·   Unable to rise without assistance  []   4   Total: (5 or greater = High Risk) 1     Falls Prevention Plan:   []                Physical Limitations to Exercise (specify):   []                Mobility Assistance Device (type):   []                Exercise/Equipment Adaptation (specify):    ©2010 [facility] [address] Patent #[id number].  Federal Law prohibits the replication, distribution or use without written permission from [facility] [address] [facility]

## 2017-08-21 ENCOUNTER — HOSPITAL ENCOUNTER (OUTPATIENT)
Dept: PHYSICAL THERAPY | Age: 77
Discharge: HOME | End: 2017-08-21
Payer: MEDICARE

## 2017-08-21 PROCEDURE — 97110 THERAPEUTIC EXERCISES: CPT | Performed by: PHYSICAL THERAPIST

## 2017-08-21 NOTE — PROGRESS NOTES
Carrie Black  : 1940 [address] [facility],[address] at [address] [facility] [address], [facility], [address]  [phone]   [phone]         OUTPATIENT PHYSICAL THERAPY:Daily Note 2017    ICD-10: Treatment Diagnosis: (M43. 26) TLIF L3-L5; (M62.81) muscle weakness; (R26.2) Difficulty walking  Precautions/Allergies:   Review of patient's allergies indicates no known allergies. Fall Risk Score: 1 (? 5 = High Risk)  MD Orders: Evaluate and Treat; Modalities, Core strengthening and stretching MEDICAL/REFERRING DIAGNOSIS:  back Z98.1 Arthodesis status  DATE OF ONSET: 17  REFERRING PHYSICIAN: Marisabel Yuen MD  RETURN PHYSICIAN APPOINTMENT: 17     INITIAL ASSESSMENT:  Mr. Shima Avery presents today 7 weeks s/p TLIF L3-L5 and synovial cyst removal at L4 progressing as anticipated. He demonstrates decreased lumbar AROM, as well as, decreased core and LE strength making gait antalgic. He c/o localized lumbar and buttock discomfort and numbness/tingling from the surgery. Pt will benefit from skilled PT to address the following deficits. PROBLEM LIST (Impacting functional limitations):  1. Decreased Strength  2. Decreased ADL/Functional Activities  3. Decreased Transfer Abilities  4. Decreased Ambulation Ability/Technique  5. Decreased Balance  6. Increased Pain  7. Decreased Activity Tolerance  8. Increased Fatigue  9. Decreased Flexibility/Joint Mobility  10. Decreased Knowledge of Precautions  11. Decreased [place] with Home Exercise Program INTERVENTIONS PLANNED:  1. Balance Exercise  2. Bed Mobility  3. Cold  4. Cryotherapy  5. Electrical Stimulation  6. Family Education  7. Gait Training  8. Heat  9. Home Exercise Program (HEP)  10. Manual Therapy  11. Neuromuscular Re-education/Strengthening  12. Range of Motion (ROM)  13. Therapeutic Activites  14. Therapeutic Exercise/Strengthening  15.  Transcutaneous Electrical Nerve Stimulation (TENS)  16. Kinesiotaping   TREATMENT PLAN:  Effective Dates: 8/15/17 TO 10/15/17. Frequency/Duration: 1-2 times a week for 8 weeks  GOALS: (Goals have been discussed and agreed upon with patient.)  Short-Term Functional Goals: Time Frame: 4 weeks (9-15-17)  1. Pt will be compliant and independent with HEP, log rolling and demonstrating correct, erect posture without an increase in LB pain. 2. Pt will improve Oswestry score to 28/50 to increase pt's overall functional mobility. 3. Pt to subjectively report a decrease in pain to 4-5/10 @ worst to increase pt's sitting, standing and walking tolerance. 4. Pt will improve Lumbar AROM to > Flex: 50%, Ext: 10%; and B SB: 25% to increase pt's overall functional mobility. 5. Pt will be able to sit-stand from std height w/out use of UE or compensatory weight shifting to rise. 6. Pt will increase standing/walking tolerance to > 30min (pt will be able to walk . 5 to 1.0miles) with manageable LB pain. 7. Pt will be able to ascend/descend 8\" stairs reciprocally with use of 1 rail for balance. 8. Pt will ambulate with a near normal gait pattern with LRD or No AD. Discharge Goals: Time Frame: 8 weeks (10-15-17)  1. Pt will improve Oswestry score to 20/50 to increase pt's overall functional mobility. 2. Pt will improve FLR from  CL to G [id number] CK to increase pt's overall functional mobility. 3. Pt will subjectively report a decrease in pain to 3/10 @ worst to allow pt to ambulate with a normal gait pattern and increase their overall functional mobility. 4. Pt will improve Lumbar AROM to > Flex: 75%; Ext: 20%: And B SB 50% to increase pt's overall functional mobility. 5. Pt will ambulate with a normal gait pattern with no assistive device. 6. Pt will be discharged from PT to [facility].   Rehabilitation Potential For Stated Goals: Good  Regarding Larissa Meeta Ariasrizwan's therapy, I certify that the treatment plan above will be carried out by a therapist or under their direction. Thank you for this referral,  Shirley Moe PT     Referring Physician Signature: Ricarda Porter MD              Date                    The information in this section was collected on 8/15/17 (except where otherwise noted). HISTORY:   History of Present Injury/Illness (Reason for Referral):  Pt reports he's had back pain for > 3 years. He states he tried GUMARO injections and PT, but nothing helped. He states he was evluated by Dr. Chela Sequeira who determined he needed a TLIF at L3-L5. He states he had sx on 6/20/17, and while Dr. Chela Sequeira was in there, he found a synovial cyst at L4. He states overall, his pain has improved, and he is now wearing a bone stimulator for the next 6-9 months. Past Medical History/Comorbidities:   Mr. Molly Shoemaker  has a past medical history of Back problem; Chronic pain; Chronic systolic heart failure ([facility] [address] Circulation problem; Colon polyps; Coronary artery disease (08/2010); Gout; Hyperlipidemia; Hypertension; Obesity (BMI 30-39.9); Pain in joint, multiple sites ( ); rheumatoid Arthritis; S/P CABG x 3; and SVT (supraventricular tachycardia) (HCC) ( ). He also has no past medical history of Difficult intubation; Malignant hyperthermia due to anesthesia; Nausea & vomiting; Pseudocholinesterase deficiency; or Unspecified adverse effect of anesthesia. Mr. Molly Shoemaker  has a past surgical history that includes colonoscopy; coronary artery bypass graft (2012); cyst removal; cardiac surg procedure unlist (08/2010); cataract removal (Left, 11/15/2016); cataract removal (Right, 11/25/2016); orthopaedic; knee arthroscopy (Bilateral); and back surgery (06/2017). Pt also had a L RCR in 2015. Social History/Living Environment:     Pt reports he lives in a provate home with his wife with 5 steps to enter. He states he required assistance with ADLs, until this week. He states he moves slowly, but is independent.    Prior Level of Function/Work/Activity:  Pt reports he was completely independent with ADLs, house hold and yard work activities. Dominant Side:         LEFT    Current Medications:       Current Outpatient Prescriptions:     HYDROcodone-homatropine (HYCODAN) 5-1.5 mg/5 mL (5 mL) syrup, Take 5 mL by mouth four (4) times daily. Max Daily Amount: 20 mL., Disp: 100 mL, Rfl: 0    predniSONE (DELTASONE) 10 mg tablet, 2x5d, 1x5d, Disp: 15 Tab, Rfl: 0    gabapentin (NEURONTIN) 600 mg tablet, Take 1 tablet by mouth  daily, Disp: 90 Tab, Rfl: 3    aspirin delayed-release 81 mg tablet, Take 81 mg by mouth daily. , Disp: , Rfl:     allopurinol (ZYLOPRIM) 300 mg tablet, Take 1 Tab by mouth nightly. Indications: URIC ACID NEPHROPATHY GOUT, Disp: 90 Tab, Rfl: 3    atorvastatin (LIPITOR) 10 mg tablet, Take 1 Tab by mouth nightly. Indications: hypercholestermia, Disp: 90 Tab, Rfl: 3    finasteride (PROSCAR) 5 mg tablet, Take 1 Tab by mouth every evening. Indications: BENIGN PROSTATIC HYPERPLASIA, Disp: 90 Tab, Rfl: 3    lisinopril (PRINIVIL, ZESTRIL) 10 mg tablet, Take 1 tab po daily  Indications: hypertension, Disp: 90 Tab, Rfl: 3    meloxicam (MOBIC) 7.5 mg tablet, Take 1 Tab by mouth daily. , Disp: 90 Tab, Rfl: 3    tamsulosin (FLOMAX) 0.4 mg capsule, Take 1 Cap by mouth every evening. Indications: BENIGN PROSTATIC HYPERPLASIA, Disp: 90 Cap, Rfl: 3    omega-3 fatty acids-vitamin e (FISH OIL) 1,000 mg cap, Take 1 Cap by mouth daily. Indications: hold until after surgery, Disp: , Rfl:     METHYL-B12/L-MEFOLATE/B6 PHOS (METANX PO), Take  by mouth two (2) times a day. Indications: energy supplement, Disp: , Rfl:     Cholecalciferol, Vitamin D3, (VITAMIN D3) 1,000 unit cap, Take 2,000 Units by mouth every morning. Indications: PREVENTION OF VITAMIN D DEFICIENCY, Disp: , Rfl:    Date Last Reviewed:  8/15/17   EXAMINATION:   Observation/Orthostatic Postural Assessment:          Pt stands with slightly L trunk lean and forward flexion.    Palpation:          Pt is moderately tender with palpation along his incision, especially at L4-L5. Posture/Deformity:  Lumbar AROM: % of normal motion in standing    Lumbar spine Date:  8/15/17 Date:   Date:     Direction  Parameters Parameters Parameters   Flexion  25%     Extension  5% w/ pain     Rotation  R: NT  L: NT     Side bending  R: 15%  L:15%     Hip R:WFL  L:WFL                   Strength Date:  8/15/17 Date:   Date:     Core/LE Parameters Parameters Parameters   Hip Flex R:3/5  L:3+/5     Hip Ext R:3-/5  L:3-/5     Hip ABD R:3-/5  L:3-/5     Hip ADD R:3/5  L:3+/5     Hip ER R:3-/5  L:3-/5     Knee Ext R:4+/5  L:4+/5     Knee Flex R:4/5  L:4/5     Ankle DF R:4/5  L:4/5     Ankle PF R:4/5  L:4/5     Ankle INV R:3/5  L:3/5     Ankle EV R:3/5  L:3/5     Double Squat (L4) NT due to acuteness of surgery     Heel Walk (L5) NT due to acuteness of surgery     Toe Walk (S1) NT due to acuteness of surgery       Myotomes: All intact; R delayed > than L  Lower Abdominals (L1):  Iliopsoas (L2):  Quadriceps (L3):  Anterior Tibialis (L4):  Extensor Hallicus Longus (L5):  Gastrocnemius (S1):    Sensation: pt reports numbness in B feet from neuropathy rather than nerve root compression from his back. He states it's equal B. Anterior Thigh (L3):  Medial Foot (L4):  Dorsal Foot (L5):  Lateral Foot (S1):       CLINICAL PRESENTATION:   CLINICAL DECISION MAKING:   Outcome Measure: Tool Used: Modified Oswestry Low Back Pain Questionnaire  Score:  Initial: 32/50 =64% disability Most Recent: X/50 (Date: -- )   Interpretation of Score: Each section is scored on a 0-5 scale, 5 representing the greatest disability. The scores of each section are added together for a total score of 50. Score 0 1-10 11-20 21-30 31-40 41-49 50   Modifier CH CI CJ CK CL CM CN     ?  Mobility - Walking and Moving Around:     - CURRENT STATUS: CL - 60%-79% impaired, limited or restricted    - GOAL STATUS: CK - 40%-59% impaired, limited or restricted    - D/C STATUS:  ---------------To be determined---------------        Medical Necessity:   · Patient is expected to demonstrate progress in strength, range of motion, balance, coordination and functional technique to decrease assistance required with house old activities , increase independence with HEP and improve safety during gait, walking for exercise and yard work. .  Reason for Services/Other Comments:  · Patient would benefit from skilled PT to improve B LE and core strength, lumbar AROM, balance and gait safety so pt can return to walking for exercise, as well as, shooting basketball. .            TREATMENT:   (In addition to Assessment/Re-Assessment sessions the following treatments were rendered)  Pre-treatment Symptoms/Complaints: Pt reports he tried to weed eat over the weekend, and that caused increased LBP. He reports other than that, just tightness and fatigue continue. Pain: Initial:     5-6/10 Post Session:  4-5/10     THERAPEUTIC EXERCISE: (50 minutes):  Exercises per grid below to improve mobility, strength, balance and coordination. Required moderate visual, verbal and tactile cues to promote proper body alignment, promote proper body posture, promote proper body mechanics and promote proper body breathing techniques. Progressed resistance, range, repetitions and complexity of movement as indicated. Date:  8/15/17 Date:  8/21/17 Date:     Activity/Exercise Parameters Parameters Parameters   Nu-Step  L4, 7min    LTR 10x 20x    SKTC 10x 15x B    DKTC 5x 10x    TAs w/ pelvic tilt 10 x 5 sec 20 x 5sec    TAs w/ add sq 10 x 5 sec 20 x 5 sec    Supine hip ABD w/ TAs and GTB  30x    S/L hip ABD  25x B    S/L clams  25x B    Sit-stand from low mat  20x           Education HEP 2x/daily; Logrolling; scar massage with cocoa butter to begin immediately HEP 2x/daily  TAs with all transitional movements. Treatment/Session Assessment: Core and B hip/LE weakness continue, L > R.  He was able to recall HEP, therefore feel he has been compliant. B hip abductors , flexors, extensors and quads remain weak. · Response to Treatment:  Pt did well with addition of above exercises with no increase in LBP or L leg pain. His L LE fatigues quickly due to continued weakness and poor muscle endurance. He required vc to avoid compensatory mechanisms with above exercises, but form improved after this. · Compliance with Program/Exercises: Pt reports compliance with HEP 2x daily. Recommendations/Intent for next treatment session: Try standing steamboats later this week for core and hip strengthening.    Future Appointments  Date Time Provider Briana Rojo   8/25/2017 10:00 AM Irene Rousseau, PT [facility]   8/30/2017 10:00 AM Irene Rousseau PT [facility]   9/5/2017 10:00 AM Irene Rousseau, PT [facility]   9/8/2017 11:00 AM Irene Rousseau, PT [facility]   9/25/2017 8:00 AM CHELLY Feldman MD [facility]     Total Treatment Duration:  PT Patient Time In/Time Out  Time In: 1005  Time Out: [address], PT

## 2017-08-25 ENCOUNTER — HOSPITAL ENCOUNTER (OUTPATIENT)
Dept: PHYSICAL THERAPY | Age: 77
Discharge: HOME | End: 2017-08-25
Payer: MEDICARE

## 2017-08-25 PROCEDURE — 97110 THERAPEUTIC EXERCISES: CPT | Performed by: PHYSICAL THERAPIST

## 2017-08-25 NOTE — PROGRESS NOTES
Sofie Black  : 1940 [address] [facility] at [address] [place] [address]. [facility] [address], [facility], [address]  [phone]   [phone]         OUTPATIENT PHYSICAL THERAPY:Daily Note 2017    ICD-10: Treatment Diagnosis: (M43. 26) TLIF L3-L5; (M62.81) muscle weakness; (R26.2) Difficulty walking  Precautions/Allergies:   Review of patient's allergies indicates no known allergies. Fall Risk Score: 1 (? 5 = High Risk)  MD Orders: Evaluate and Treat; Modalities, Core strengthening and stretching MEDICAL/REFERRING DIAGNOSIS:  back Z98.1 Arthodesis status  DATE OF ONSET: 17  REFERRING PHYSICIAN: Riri Hdz MD  RETURN PHYSICIAN APPOINTMENT: 17     INITIAL ASSESSMENT:  Mr. Rashmi Grewal presents today 7 weeks s/p TLIF L3-L5 and synovial cyst removal at L4 progressing as anticipated. He demonstrates decreased lumbar AROM, as well as, decreased core and LE strength making gait antalgic. He c/o localized lumbar and buttock discomfort and numbness/tingling from the surgery. Pt will benefit from skilled PT to address the following deficits. PROBLEM LIST (Impacting functional limitations):  1. Decreased Strength  2. Decreased ADL/Functional Activities  3. Decreased Transfer Abilities  4. Decreased Ambulation Ability/Technique  5. Decreased Balance  6. Increased Pain  7. Decreased Activity Tolerance  8. Increased Fatigue  9. Decreased Flexibility/Joint Mobility  10. Decreased Knowledge of Precautions  11. Decreased Worth with Home Exercise Program INTERVENTIONS PLANNED:  1. Balance Exercise  2. Bed Mobility  3. Cold  4. Cryotherapy  5. Electrical Stimulation  6. Family Education  7. Gait Training  8. Heat  9. Home Exercise Program (HEP)  10. Manual Therapy  11. Neuromuscular Re-education/Strengthening  12. Range of Motion (ROM)  13. Therapeutic Activites  14. Therapeutic Exercise/Strengthening  15.  Transcutaneous Electrical Nerve Stimulation (TENS)  16. Kinesiotaping   TREATMENT PLAN:  Effective Dates: 8/15/17 TO 10/15/17. Frequency/Duration: 1-2 times a week for 8 weeks  GOALS: (Goals have been discussed and agreed upon with patient.)  Short-Term Functional Goals: Time Frame: 4 weeks (9-15-17)  1. Pt will be compliant and independent with HEP, log rolling and demonstrating correct, erect posture without an increase in LB pain. 2. Pt will improve Oswestry score to 28/50 to increase pt's overall functional mobility. 3. Pt to subjectively report a decrease in pain to 4-5/10 @ worst to increase pt's sitting, standing and walking tolerance. 4. Pt will improve Lumbar AROM to > Flex: 50%, Ext: 10%; and B SB: 25% to increase pt's overall functional mobility. 5. Pt will be able to sit-stand from std height w/out use of UE or compensatory weight shifting to rise. 6. Pt will increase standing/walking tolerance to > 30min (pt will be able to walk . 5 to 1.0miles) with manageable LB pain. 7. Pt will be able to ascend/descend 8\" stairs reciprocally with use of 1 rail for balance. 8. Pt will ambulate with a near normal gait pattern with LRD or No AD. Discharge Goals: Time Frame: 8 weeks (10-15-17)  1. Pt will improve Oswestry score to 20/50 to increase pt's overall functional mobility. 2. Pt will improve FLR from  CL to G [id number] CK to increase pt's overall functional mobility. 3. Pt will subjectively report a decrease in pain to 3/10 @ worst to allow pt to ambulate with a normal gait pattern and increase their overall functional mobility. 4. Pt will improve Lumbar AROM to > Flex: 75%; Ext: 20%: And B SB 50% to increase pt's overall functional mobility. 5. Pt will ambulate with a normal gait pattern with no assistive device. 6. Pt will be discharged from PT to [facility].   Rehabilitation Potential For Stated Goals: Good  Regarding Elmaton Vianey Black's therapy, I certify that the treatment plan above will be carried out by a therapist or under their direction. Thank you for this referral,  Samaria Pierre PT     Referring Physician Signature: Anna Izaguirre MD              Date                    The information in this section was collected on 8/15/17 (except where otherwise noted). HISTORY:   History of Present Injury/Illness (Reason for Referral):  Pt reports he's had back pain for > 3 years. He states he tried GUMARO injections and PT, but nothing helped. He states he was evluated by Dr. Laurie Lynn who determined he needed a TLIF at L3-L5. He states he had sx on 6/20/17, and while Dr. Laurie Lynn was in there, he found a synovial cyst at L4. He states overall, his pain has improved, and he is now wearing a bone stimulator for the next 6-9 months. Past Medical History/Comorbidities:   Mr. Silverio Oakley  has a past medical history of Back problem; Chronic pain; Chronic systolic heart failure ([address] Circulation problem; Colon polyps; Coronary artery disease (08/2010); Gout; Hyperlipidemia; Hypertension; Obesity (BMI 30-39.9); Pain in joint, multiple sites ( ); rheumatoid Arthritis; S/P CABG x 3; and SVT (supraventricular tachycardia) ([facility]) ( ). He also has no past medical history of Difficult intubation; Malignant hyperthermia due to anesthesia; Nausea & vomiting; Pseudocholinesterase deficiency; or Unspecified adverse effect of anesthesia. Mr. Silverio Oakley  has a past surgical history that includes colonoscopy; coronary artery bypass graft (2012); cyst removal; cardiac surg procedure unlist (08/2010); cataract removal (Left, 11/15/2016); cataract removal (Right, 11/25/2016); orthopaedic; knee arthroscopy (Bilateral); and back surgery (06/2017). Pt also had a L RCR in 2015. Social History/Living Environment:     Pt reports he lives in a provate home with his wife with 5 steps to enter. He states he required assistance with ADLs, until this week. He states he moves slowly, but is independent.    Prior Level of Function/Work/Activity:  Pt reports he was completely independent with ADLs, house hold and yard work activities. Dominant Side:         LEFT    Current Medications:       Current Outpatient Prescriptions:     HYDROcodone-homatropine (HYCODAN) 5-1.5 mg/5 mL (5 mL) syrup, Take 5 mL by mouth four (4) times daily. Max Daily Amount: 20 mL., Disp: 100 mL, Rfl: 0    predniSONE (DELTASONE) 10 mg tablet, 2x5d, 1x5d, Disp: 15 Tab, Rfl: 0    gabapentin (NEURONTIN) 600 mg tablet, Take 1 tablet by mouth  daily, Disp: 90 Tab, Rfl: 3    aspirin delayed-release 81 mg tablet, Take 81 mg by mouth daily. , Disp: , Rfl:     allopurinol (ZYLOPRIM) 300 mg tablet, Take 1 Tab by mouth nightly. Indications: URIC ACID NEPHROPATHY GOUT, Disp: 90 Tab, Rfl: 3    atorvastatin (LIPITOR) 10 mg tablet, Take 1 Tab by mouth nightly. Indications: hypercholestermia, Disp: 90 Tab, Rfl: 3    finasteride (PROSCAR) 5 mg tablet, Take 1 Tab by mouth every evening. Indications: BENIGN PROSTATIC HYPERPLASIA, Disp: 90 Tab, Rfl: 3    lisinopril (PRINIVIL, ZESTRIL) 10 mg tablet, Take 1 tab po daily  Indications: hypertension, Disp: 90 Tab, Rfl: 3    meloxicam (MOBIC) 7.5 mg tablet, Take 1 Tab by mouth daily. , Disp: 90 Tab, Rfl: 3    tamsulosin (FLOMAX) 0.4 mg capsule, Take 1 Cap by mouth every evening. Indications: BENIGN PROSTATIC HYPERPLASIA, Disp: 90 Cap, Rfl: 3    omega-3 fatty acids-vitamin e (FISH OIL) 1,000 mg cap, Take 1 Cap by mouth daily. Indications: hold until after surgery, Disp: , Rfl:     METHYL-B12/L-MEFOLATE/B6 PHOS (METANX PO), Take  by mouth two (2) times a day. Indications: energy supplement, Disp: , Rfl:     Cholecalciferol, Vitamin D3, (VITAMIN D3) 1,000 unit cap, Take 2,000 Units by mouth every morning. Indications: PREVENTION OF VITAMIN D DEFICIENCY, Disp: , Rfl:    Date Last Reviewed:  8/15/17   EXAMINATION:   Observation/Orthostatic Postural Assessment:          Pt stands with slightly L trunk lean and forward flexion.    Palpation:          Pt is moderately tender with palpation along his incision, especially at L4-L5. Posture/Deformity:  Lumbar AROM: % of normal motion in standing    Lumbar spine Date:  8/15/17 Date:   Date:     Direction  Parameters Parameters Parameters   Flexion  25%     Extension  5% w/ pain     Rotation  R: NT  L: NT     Side bending  R: 15%  L:15%     Hip R:WFL  L:WFL                   Strength Date:  8/15/17 Date:   Date:     Core/LE Parameters Parameters Parameters   Hip Flex R:3/5  L:3+/5     Hip Ext R:3-/5  L:3-/5     Hip ABD R:3-/5  L:3-/5     Hip ADD R:3/5  L:3+/5     Hip ER R:3-/5  L:3-/5     Knee Ext R:4+/5  L:4+/5     Knee Flex R:4/5  L:4/5     Ankle DF R:4/5  L:4/5     Ankle PF R:4/5  L:4/5     Ankle INV R:3/5  L:3/5     Ankle EV R:3/5  L:3/5     Double Squat (L4) NT due to acuteness of surgery     Heel Walk (L5) NT due to acuteness of surgery     Toe Walk (S1) NT due to acuteness of surgery       Myotomes: All intact; R delayed > than L  Lower Abdominals (L1):  Iliopsoas (L2):  Quadriceps (L3):  Anterior Tibialis (L4):  Extensor Hallicus Longus (L5):  Gastrocnemius (S1):    Sensation: pt reports numbness in B feet from neuropathy rather than nerve root compression from his back. He states it's equal B. Anterior Thigh (L3):  Medial Foot (L4):  Dorsal Foot (L5):  Lateral Foot (S1):       CLINICAL PRESENTATION:   CLINICAL DECISION MAKING:   Outcome Measure: Tool Used: Modified Oswestry Low Back Pain Questionnaire  Score:  Initial: 32/50 =64% disability Most Recent: X/50 (Date: -- )   Interpretation of Score: Each section is scored on a 0-5 scale, 5 representing the greatest disability. The scores of each section are added together for a total score of 50. Score 0 1-10 11-20 21-30 31-40 41-49 50   Modifier CH CI CJ CK CL CM CN     ?  Mobility - Walking and Moving Around:     - CURRENT STATUS: CL - 60%-79% impaired, limited or restricted    - GOAL STATUS: CK - 40%-59% impaired, limited or restricted    - D/C STATUS:  ---------------To be determined---------------        Medical Necessity:   · Patient is expected to demonstrate progress in strength, range of motion, balance, coordination and functional technique to decrease assistance required with house old activities , increase independence with HEP and improve safety during gait, walking for exercise and yard work. .  Reason for Services/Other Comments:  · Patient would benefit from skilled PT to improve B LE and core strength, lumbar AROM, balance and gait safety so pt can return to walking for exercise, as well as, shooting basketball. .            TREATMENT:   (In addition to Assessment/Re-Assessment sessions the following treatments were rendered)  Pre-treatment Symptoms/Complaints:  Pt denied an increase in pain or soreness after last PT visit. He states compliance with HEP, as well as, walking with a grocery cart. Pain: Initial:     510 Post Session:  4-5/10     THERAPEUTIC EXERCISE: (50 minutes):  Exercises per grid below to improve mobility, strength, balance and coordination. Required moderate visual, verbal and tactile cues to promote proper body alignment, promote proper body posture, promote proper body mechanics and promote proper body breathing techniques. Progressed resistance, range, repetitions and complexity of movement as indicated. Date:  8/15/17 Date:  8/21/17 Date:  8/25/17     Activity/Exercise Parameters Parameters Parameters   Nu-Step  L4, 7min L3.5/8.5min   LTR 10x 20x 20x B   SKTC 10x 15x B 10x B   DKTC 5x 10x 5x   TAs w/ pelvic tilt 10 x 5 sec 20 x 5sec 20 x 5sec   TAs w/ add sq 10 x 5 sec 20 x 5 sec 20 x 5sec   Supine hip ABD w/ TAs and GTB  30x BTB, 30x   S/L hip ABD  25x B 25x B   S/L clams  25x B 25x B   Sit-stand from low mat  20x  25x   Seated knee Ext   RTB, 25x B   Seated HSC   RTB, 25x B   Cat/Cow stretch in Quadruped   10x         Education HEP 2x/daily;  Logrolling; scar massage with cocoa butter to begin immediately HEP 2x/daily  TAs with all transitional movements. HEP 2x/daily  TAs/glute sets          Treatment/Session Assessment: Pt remains challenged by S/L hip ABD, as well as, clams due to hip abductor weakness. He also required vc to maintain good, erect posture with seated LAQ/HSC due to core and hip weakness. · Response to Treatment: Pt c/o a min increase in LBP during and after gentle cat/cow stretching. · Compliance with Program/Exercises: Pt reports compliance with HEP 1-2x daily. Recommendations/Intent for next treatment session: Progress to more standing exercises as pt is able. Try stdg SLS, as well as, short-range steamboats to further improve strength.    Future Appointments  Date Time Provider Briana Rojo   8/30/2017 10:00 AM Mylene Oakes, PT [facility]   9/5/2017 10:00 AM Mylene Oakes PT [facility]   9/8/2017 11:00 AM Mylene Oakes, PT [facility]   9/25/2017 8:00 AM CHELLY Matthews MD [facility]     Total Treatment Duration:  PT Patient Time In/Time Out  Time In: 1005  Time Out: [address], PT

## 2017-08-30 ENCOUNTER — HOSPITAL ENCOUNTER (OUTPATIENT)
Dept: PHYSICAL THERAPY | Age: 77
Discharge: HOME | End: 2017-08-30
Payer: MEDICARE

## 2017-08-30 PROCEDURE — 97110 THERAPEUTIC EXERCISES: CPT | Performed by: PHYSICAL THERAPIST

## 2017-08-30 NOTE — PROGRESS NOTES
Edson Black  : 1940 [address] [place] [address] at [address] [place] [address]. [facility] [address], [facility], [address]  [phone]   [phone]         OUTPATIENT PHYSICAL THERAPY:Daily Note 2017    ICD-10: Treatment Diagnosis: (M43. 26) TLIF L3-L5; (M62.81) muscle weakness; (R26.2) Difficulty walking  Precautions/Allergies:   Review of patient's allergies indicates no known allergies. Fall Risk Score: 1 (? 5 = High Risk)  MD Orders: Evaluate and Treat; Modalities, Core strengthening and stretching MEDICAL/REFERRING DIAGNOSIS:  back Z98.1 Arthodesis status  DATE OF ONSET: 17  REFERRING PHYSICIAN: Mar Lucas MD  RETURN PHYSICIAN APPOINTMENT: 17     INITIAL ASSESSMENT:  Mr. Ricardo Rushing presents today 7 weeks s/p TLIF L3-L5 and synovial cyst removal at L4 progressing as anticipated. He demonstrates decreased lumbar AROM, as well as, decreased core and LE strength making gait antalgic. He c/o localized lumbar and buttock discomfort and numbness/tingling from the surgery. Pt will benefit from skilled PT to address the following deficits. PROBLEM LIST (Impacting functional limitations):  1. Decreased Strength  2. Decreased ADL/Functional Activities  3. Decreased Transfer Abilities  4. Decreased Ambulation Ability/Technique  5. Decreased Balance  6. Increased Pain  7. Decreased Activity Tolerance  8. Increased Fatigue  9. Decreased Flexibility/Joint Mobility  10. Decreased Knowledge of Precautions  11. Decreased Simms with Home Exercise Program INTERVENTIONS PLANNED:  1. Balance Exercise  2. Bed Mobility  3. Cold  4. Cryotherapy  5. Electrical Stimulation  6. Family Education  7. Gait Training  8. Heat  9. Home Exercise Program (HEP)  10. Manual Therapy  11. Neuromuscular Re-education/Strengthening  12. Range of Motion (ROM)  13. Therapeutic Activites  14. Therapeutic Exercise/Strengthening  15.  Transcutaneous Electrical Nerve Stimulation (TENS)  16. Kinesiotaping   TREATMENT PLAN:  Effective Dates: 8/15/17 TO 10/15/17. Frequency/Duration: 1-2 times a week for 8 weeks  GOALS: (Goals have been discussed and agreed upon with patient.)  Short-Term Functional Goals: Time Frame: 4 weeks (9-15-17)  1. Pt will be compliant and independent with HEP, log rolling and demonstrating correct, erect posture without an increase in LB pain. 2. Pt will improve Oswestry score to 28/50 to increase pt's overall functional mobility. 3. Pt to subjectively report a decrease in pain to 4-5/10 @ worst to increase pt's sitting, standing and walking tolerance. 4. Pt will improve Lumbar AROM to > Flex: 50%, Ext: 10%; and B SB: 25% to increase pt's overall functional mobility. 5. Pt will be able to sit-stand from std height w/out use of UE or compensatory weight shifting to rise. 6. Pt will increase standing/walking tolerance to > 30min (pt will be able to walk . 5 to 1.0miles) with manageable LB pain. 7. Pt will be able to ascend/descend 8\" stairs reciprocally with use of 1 rail for balance. 8. Pt will ambulate with a near normal gait pattern with LRD or No AD. Discharge Goals: Time Frame: 8 weeks (10-15-17)  1. Pt will improve Oswestry score to 20/50 to increase pt's overall functional mobility. 2. Pt will improve FLR from  CL to G [id number] CK to increase pt's overall functional mobility. 3. Pt will subjectively report a decrease in pain to 3/10 @ worst to allow pt to ambulate with a normal gait pattern and increase their overall functional mobility. 4. Pt will improve Lumbar AROM to > Flex: 75%; Ext: 20%: And B SB 50% to increase pt's overall functional mobility. 5. Pt will ambulate with a normal gait pattern with no assistive device. 6. Pt will be discharged from PT to [facility].   Rehabilitation Potential For Stated Goals: Good  Regarding Sonya Ariasrizwan's therapy, I certify that the treatment plan above will be carried out by a therapist or under their direction. Thank you for this referral,  Stevenson Carmen PT     Referring Physician Signature: Jay Seth MD              Date                    The information in this section was collected on 8/15/17 (except where otherwise noted). HISTORY:   History of Present Injury/Illness (Reason for Referral):  Pt reports he's had back pain for > 3 years. He states he tried GUMARO injections and PT, but nothing helped. He states he was evluated by Dr. Leonora Verdin who determined he needed a TLIF at L3-L5. He states he had sx on 6/20/17, and while Dr. Leonora Verdin was in there, he found a synovial cyst at L4. He states overall, his pain has improved, and he is now wearing a bone stimulator for the next 6-9 months. Past Medical History/Comorbidities:   Mr. Evelia Valdez  has a past medical history of Back problem; Chronic pain; Chronic systolic heart failure ([facility] [address] Circulation problem; Colon polyps; Coronary artery disease (08/2010); Gout; Hyperlipidemia; Hypertension; Obesity (BMI 30-39.9); Pain in joint, multiple sites ( ); rheumatoid Arthritis; S/P CABG x 3; and SVT (supraventricular tachycardia) ([facility]) ( ). He also has no past medical history of Difficult intubation; Malignant hyperthermia due to anesthesia; Nausea & vomiting; Pseudocholinesterase deficiency; or Unspecified adverse effect of anesthesia. Mr. Evelia Valdez  has a past surgical history that includes colonoscopy; coronary artery bypass graft (2012); cyst removal; cardiac surg procedure unlist (08/2010); cataract removal (Left, 11/15/2016); cataract removal (Right, 11/25/2016); orthopaedic; knee arthroscopy (Bilateral); and back surgery (06/2017). Pt also had a L RCR in 2015. Social History/Living Environment:     Pt reports he lives in a provate home with his wife with 5 steps to enter. He states he required assistance with ADLs, until this week. He states he moves slowly, but is independent.    Prior Level of Function/Work/Activity:  Pt reports he was completely independent with ADLs, house hold and yard work activities. Dominant Side:         LEFT    Current Medications:       Current Outpatient Prescriptions:     HYDROcodone-homatropine (HYCODAN) 5-1.5 mg/5 mL (5 mL) syrup, Take 5 mL by mouth four (4) times daily. Max Daily Amount: 20 mL., Disp: 100 mL, Rfl: 0    predniSONE (DELTASONE) 10 mg tablet, 2x5d, 1x5d, Disp: 15 Tab, Rfl: 0    gabapentin (NEURONTIN) 600 mg tablet, Take 1 tablet by mouth  daily, Disp: 90 Tab, Rfl: 3    aspirin delayed-release 81 mg tablet, Take 81 mg by mouth daily. , Disp: , Rfl:     allopurinol (ZYLOPRIM) 300 mg tablet, Take 1 Tab by mouth nightly. Indications: URIC ACID NEPHROPATHY GOUT, Disp: 90 Tab, Rfl: 3    atorvastatin (LIPITOR) 10 mg tablet, Take 1 Tab by mouth nightly. Indications: hypercholestermia, Disp: 90 Tab, Rfl: 3    finasteride (PROSCAR) 5 mg tablet, Take 1 Tab by mouth every evening. Indications: BENIGN PROSTATIC HYPERPLASIA, Disp: 90 Tab, Rfl: 3    lisinopril (PRINIVIL, ZESTRIL) 10 mg tablet, Take 1 tab po daily  Indications: hypertension, Disp: 90 Tab, Rfl: 3    meloxicam (MOBIC) 7.5 mg tablet, Take 1 Tab by mouth daily. , Disp: 90 Tab, Rfl: 3    tamsulosin (FLOMAX) 0.4 mg capsule, Take 1 Cap by mouth every evening. Indications: BENIGN PROSTATIC HYPERPLASIA, Disp: 90 Cap, Rfl: 3    omega-3 fatty acids-vitamin e (FISH OIL) 1,000 mg cap, Take 1 Cap by mouth daily. Indications: hold until after surgery, Disp: , Rfl:     METHYL-B12/L-MEFOLATE/B6 PHOS (METANX PO), Take  by mouth two (2) times a day. Indications: energy supplement, Disp: , Rfl:     Cholecalciferol, Vitamin D3, (VITAMIN D3) 1,000 unit cap, Take 2,000 Units by mouth every morning. Indications: PREVENTION OF VITAMIN D DEFICIENCY, Disp: , Rfl:    Date Last Reviewed:  8/15/17   EXAMINATION:   Observation/Orthostatic Postural Assessment:          Pt stands with slightly L trunk lean and forward flexion.    Palpation:          Pt is moderately tender with palpation along his incision, especially at L4-L5. Posture/Deformity:  Lumbar AROM: % of normal motion in standing    Lumbar spine Date:  8/15/17 Date:   Date:     Direction  Parameters Parameters Parameters   Flexion  25%     Extension  5% w/ pain     Rotation  R: NT  L: NT     Side bending  R: 15%  L:15%     Hip R:WFL  L:WFL                   Strength Date:  8/15/17 Date:   Date:     Core/LE Parameters Parameters Parameters   Hip Flex R:3/5  L:3+/5     Hip Ext R:3-/5  L:3-/5     Hip ABD R:3-/5  L:3-/5     Hip ADD R:3/5  L:3+/5     Hip ER R:3-/5  L:3-/5     Knee Ext R:4+/5  L:4+/5     Knee Flex R:4/5  L:4/5     Ankle DF R:4/5  L:4/5     Ankle PF R:4/5  L:4/5     Ankle INV R:3/5  L:3/5     Ankle EV R:3/5  L:3/5     Double Squat (L4) NT due to acuteness of surgery     Heel Walk (L5) NT due to acuteness of surgery     Toe Walk (S1) NT due to acuteness of surgery       Myotomes: All intact; R delayed > than L  Lower Abdominals (L1):  Iliopsoas (L2):  Quadriceps (L3):  Anterior Tibialis (L4):  Extensor Hallicus Longus (L5):  Gastrocnemius (S1):    Sensation: pt reports numbness in B feet from neuropathy rather than nerve root compression from his back. He states it's equal B. Anterior Thigh (L3):  Medial Foot (L4):  Dorsal Foot (L5):  Lateral Foot (S1):       CLINICAL PRESENTATION:   CLINICAL DECISION MAKING:   Outcome Measure: Tool Used: Modified Oswestry Low Back Pain Questionnaire  Score:  Initial: 32/50 =64% disability Most Recent: X/50 (Date: -- )   Interpretation of Score: Each section is scored on a 0-5 scale, 5 representing the greatest disability. The scores of each section are added together for a total score of 50. Score 0 1-10 11-20 21-30 31-40 41-49 50   Modifier CH CI CJ CK CL CM CN     ?  Mobility - Walking and Moving Around:     - CURRENT STATUS: CL - 60%-79% impaired, limited or restricted    - GOAL STATUS: CK - 40%-59% impaired, limited or restricted    - D/C STATUS:  ---------------To be determined---------------        Medical Necessity:   · Patient is expected to demonstrate progress in strength, range of motion, balance, coordination and functional technique to decrease assistance required with house old activities , increase independence with HEP and improve safety during gait, walking for exercise and yard work. .  Reason for Services/Other Comments:  · Patient would benefit from skilled PT to improve B LE and core strength, lumbar AROM, balance and gait safety so pt can return to walking for exercise, as well as, shooting basketball. .            TREATMENT:   (In addition to Assessment/Re-Assessment sessions the following treatments were rendered)  Pre-treatment Symptoms/Complaints: Pt reports an increase in lumbar soreness today due likely to the cool, rainy weather. Pain: Initial:     6/10 Post Session:  4/10     THERAPEUTIC EXERCISE: (60 minutes):  Exercises per grid below to improve mobility, strength, balance and coordination. Required moderate visual, verbal and tactile cues to promote proper body alignment, promote proper body posture, promote proper body mechanics and promote proper body breathing techniques. Progressed resistance, range, repetitions and complexity of movement as indicated. Date:  8/21/17 Date:  8/25/17   Date:  8/30/17   Activity/Exercise Parameters Parameters Parameters   Nu-Step L4, 7min L3.5/8.5min L4/10min   LTR 20x 20x B On/off green ball, 40x total   SKTC 15x B 10x B 10x B   DKTC 10x 5x 20x B on/off green ball   TAs w/ pelvic tilt 20 x 5sec 20 x 5sec    TAs w/ add sq 20 x 5 sec 20 x 5sec 20 x 8sec   Supine hip ABD w/ TAs and GTB 30x BTB, 30x    S/L hip ABD 25x B 25x B 25xB   S/L clams 25x B 25x B GTB, 30x B   SLR   2#, 20x B   Sit-stand from low mat 20x  25x 30x   Seated knee Ext  RTB, 25x B    Seated HSC  RTB, 25x B    Cat/Cow stretch in Quadruped  10x    Stdg SLS   3 x 5sec B   Education HEP 2x/daily  TAs with all transitional movements.  HEP 2x/daily  TAs/glute sets  HEP 2x daily/TAs/glute sets w/ all act         Treatment/Session Assessment: Stdg SLS was very challenging for pt due to B hip/LE weakness, as well as, foot neuropathy. His form improved with verbal, visual and tactile cueing, however, he fatigued very quickly due to weakness and poor muscle endurance. · Response to Treatment: Pt c/o a min increase in R side LBP, when he performed SLR with improper form. Pain decreased with shorter range. I decided not to add SLR or stdg SLS to HEP today, as I feel we need to go over it again. · Compliance with Program/Exercises: Pt reports compliance with HEP 1-2x daily. Recommendations/Intent for next treatment session: Progress standing, postural and balance exercises as pt tolerates. Try steamboats next week.    Future Appointments  Date Time Provider Briana Rojo   9/5/2017 10:00 AM Samaria Pierre PT [facility] AND [facility]   9/8/2017 11:00 AM Samaria Pierre PT [facility]   9/25/2017 8:00 AM CHELLY Soria MD [facility]     Total Treatment Duration:  PT Patient Time In/Time Out  Time In: 1000  Time Out: [address], PT

## 2017-09-05 ENCOUNTER — HOSPITAL ENCOUNTER (OUTPATIENT)
Dept: PHYSICAL THERAPY | Age: 77
Discharge: HOME | End: 2017-09-05
Payer: MEDICARE

## 2017-09-05 PROCEDURE — 97110 THERAPEUTIC EXERCISES: CPT | Performed by: PHYSICAL THERAPIST

## 2017-09-05 NOTE — PROGRESS NOTES
Tami Black  : 1940 [id number] [facility] [address], [facility], [address]  [phone]   [phone]         OUTPATIENT PHYSICAL THERAPY:Daily Note 2017    ICD-10: Treatment Diagnosis: (M43. 26) TLIF L3-L5; (M62.81) muscle weakness; (R26.2) Difficulty walking  Precautions/Allergies:   Review of patient's allergies indicates no known allergies. Fall Risk Score: 1 (? 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 Parth Russo presents today 7 weeks s/p TLIF L3-L5 and synovial cyst removal at L4 progressing as anticipated. He demonstrates decreased lumbar AROM, as well as, decreased core and LE strength making gait antalgic. He c/o localized lumbar and buttock discomfort and numbness/tingling from the surgery. Pt will benefit from skilled PT to address the following deficits. PROBLEM LIST (Impacting functional limitations):  1. Decreased Strength  2. Decreased ADL/Functional Activities  3. Decreased Transfer Abilities  4. Decreased Ambulation Ability/Technique  5. Decreased Balance  6. Increased Pain  7. Decreased Activity Tolerance  8. Increased Fatigue  9. Decreased Flexibility/Joint Mobility  10. Decreased Knowledge of Precautions  11. Decreased [place] with Home Exercise Program INTERVENTIONS PLANNED:  1. Balance Exercise  2. Bed Mobility  3. Cold  4. Cryotherapy  5. Electrical Stimulation  6. Family Education  7. Gait Training  8. Heat  9. Home Exercise Program (HEP)  10. Manual Therapy  11. Neuromuscular Re-education/Strengthening  12. Range of Motion (ROM)  13. Therapeutic Activites  14. Therapeutic Exercise/Strengthening  15.  Transcutaneous Electrical Nerve Stimulation (TENS)  16. Kinesiotaping   TREATMENT PLAN:  Effective Dates: 8/15/17 TO 10/15/17. Frequency/Duration: 1-2 times a week for 8 weeks  GOALS: (Goals have been discussed and agreed upon with patient.)  Short-Term Functional Goals: Time Frame: 4 weeks (9-15-17)  1. Pt will be compliant and independent with HEP, log rolling and demonstrating correct, erect posture without an increase in LB pain. 2. Pt will improve Oswestry score to 28/50 to increase pt's overall functional mobility. 3. Pt to subjectively report a decrease in pain to 4-5/10 @ worst to increase pt's sitting, standing and walking tolerance. 4. Pt will improve Lumbar AROM to > Flex: 50%, Ext: 10%; and B SB: 25% to increase pt's overall functional mobility. 5. Pt will be able to sit-stand from std height w/out use of UE or compensatory weight shifting to rise. 6. Pt will increase standing/walking tolerance to > 30min (pt will be able to walk . 5 to 1.0miles) with manageable LB pain. 7. Pt will be able to ascend/descend 8\" stairs reciprocally with use of 1 rail for balance. 8. Pt will ambulate with a near normal gait pattern with LRD or No AD. Discharge Goals: Time Frame: 8 weeks (10-15-17)  1. Pt will improve Oswestry score to 20/50 to increase pt's overall functional mobility. 2. Pt will improve FLR from  CL to G [id number] CK to increase pt's overall functional mobility. 3. Pt will subjectively report a decrease in pain to 3/10 @ worst to allow pt to ambulate with a normal gait pattern and increase their overall functional mobility. 4. Pt will improve Lumbar AROM to > Flex: 75%; Ext: 20%: And B SB 50% to increase pt's overall functional mobility. 5. Pt will ambulate with a normal gait pattern with no assistive device. 6. Pt will be discharged from PT to [facility].   Rehabilitation Potential For Stated Goals: Good  Regarding Ishmael Black's therapy, I certify that the treatment plan above will be carried out by a therapist or under their direction. Thank you for this referral,  Alyssa Ellington PT     Referring Physician Signature: Mar Lucas MD              Date                    The information in this section was collected on 8/15/17 (except where otherwise noted). HISTORY:   History of Present Injury/Illness (Reason for Referral):  Pt reports he's had back pain for > 3 years. He states he tried GUMARO injections and PT, but nothing helped. He states he was evluated by Dr. Steve Sanchez who determined he needed a TLIF at L3-L5. He states he had sx on 6/20/17, and while Dr. Steve Sanchez was in there, he found a synovial cyst at L4. He states overall, his pain has improved, and he is now wearing a bone stimulator for the next 6-9 months. Past Medical History/Comorbidities:   Mr. Ricardo Rushing  has a past medical history of Back problem; Chronic pain; Chronic systolic heart failure ([address] Circulation problem; Colon polyps; Coronary artery disease (08/2010); Gout; Hyperlipidemia; Hypertension; Obesity (BMI 30-39.9); Pain in joint, multiple sites ( ); rheumatoid Arthritis; S/P CABG x 3; and SVT (supraventricular tachycardia) ([facility]) ( ). He also has no past medical history of Difficult intubation; Malignant hyperthermia due to anesthesia; Nausea & vomiting; Pseudocholinesterase deficiency; or Unspecified adverse effect of anesthesia. Mr. Ricardo Rushing  has a past surgical history that includes colonoscopy; coronary artery bypass graft (2012); cyst removal; cardiac surg procedure unlist (08/2010); cataract removal (Left, 11/15/2016); cataract removal (Right, 11/25/2016); orthopaedic; knee arthroscopy (Bilateral); and back surgery (06/2017). Pt also had a L RCR in 2015. Social History/Living Environment:     Pt reports he lives in a provate home with his wife with 5 steps to enter. He states he required assistance with ADLs, until this week. He states he moves slowly, but is independent.    Prior Level of Function/Work/Activity:  Pt reports he was completely independent with ADLs, house hold and yard work activities. Dominant Side:         LEFT    Current Medications:       Current Outpatient Prescriptions:     HYDROcodone-homatropine (HYCODAN) 5-1.5 mg/5 mL (5 mL) syrup, Take 5 mL by mouth four (4) times daily. Max Daily Amount: 20 mL., Disp: 100 mL, Rfl: 0    predniSONE (DELTASONE) 10 mg tablet, 2x5d, 1x5d, Disp: 15 Tab, Rfl: 0    gabapentin (NEURONTIN) 600 mg tablet, Take 1 tablet by mouth  daily, Disp: 90 Tab, Rfl: 3    aspirin delayed-release 81 mg tablet, Take 81 mg by mouth daily. , Disp: , Rfl:     allopurinol (ZYLOPRIM) 300 mg tablet, Take 1 Tab by mouth nightly. Indications: URIC ACID NEPHROPATHY GOUT, Disp: 90 Tab, Rfl: 3    atorvastatin (LIPITOR) 10 mg tablet, Take 1 Tab by mouth nightly. Indications: hypercholestermia, Disp: 90 Tab, Rfl: 3    finasteride (PROSCAR) 5 mg tablet, Take 1 Tab by mouth every evening. Indications: BENIGN PROSTATIC HYPERPLASIA, Disp: 90 Tab, Rfl: 3    lisinopril (PRINIVIL, ZESTRIL) 10 mg tablet, Take 1 tab po daily  Indications: hypertension, Disp: 90 Tab, Rfl: 3    meloxicam (MOBIC) 7.5 mg tablet, Take 1 Tab by mouth daily. , Disp: 90 Tab, Rfl: 3    tamsulosin (FLOMAX) 0.4 mg capsule, Take 1 Cap by mouth every evening. Indications: BENIGN PROSTATIC HYPERPLASIA, Disp: 90 Cap, Rfl: 3    omega-3 fatty acids-vitamin e (FISH OIL) 1,000 mg cap, Take 1 Cap by mouth daily. Indications: hold until after surgery, Disp: , Rfl:     METHYL-B12/L-MEFOLATE/B6 PHOS (METANX PO), Take  by mouth two (2) times a day. Indications: energy supplement, Disp: , Rfl:     Cholecalciferol, Vitamin D3, (VITAMIN D3) 1,000 unit cap, Take 2,000 Units by mouth every morning. Indications: PREVENTION OF VITAMIN D DEFICIENCY, Disp: , Rfl:    Date Last Reviewed:  8/15/17   EXAMINATION:   Observation/Orthostatic Postural Assessment:          Pt stands with slightly L trunk lean and forward flexion.    Palpation:          Pt is moderately tender with palpation along his incision, especially at L4-L5. Posture/Deformity:  Lumbar AROM: % of normal motion in standing    Lumbar spine Date:  8/15/17 Date:   Date:     Direction  Parameters Parameters Parameters   Flexion  25%     Extension  5% w/ pain     Rotation  R: NT  L: NT     Side bending  R: 15%  L:15%     Hip R:WFL  L:WFL                   Strength Date:  8/15/17 Date:   Date:     Core/LE Parameters Parameters Parameters   Hip Flex R:3/5  L:3+/5     Hip Ext R:3-/5  L:3-/5     Hip ABD R:3-/5  L:3-/5     Hip ADD R:3/5  L:3+/5     Hip ER R:3-/5  L:3-/5     Knee Ext R:4+/5  L:4+/5     Knee Flex R:4/5  L:4/5     Ankle DF R:4/5  L:4/5     Ankle PF R:4/5  L:4/5     Ankle INV R:3/5  L:3/5     Ankle EV R:3/5  L:3/5     Double Squat (L4) NT due to acuteness of surgery     Heel Walk (L5) NT due to acuteness of surgery     Toe Walk (S1) NT due to acuteness of surgery       Myotomes: All intact; R delayed > than L  Lower Abdominals (L1):  Iliopsoas (L2):  Quadriceps (L3):  Anterior Tibialis (L4):  Extensor Hallicus Longus (L5):  Gastrocnemius (S1):    Sensation: pt reports numbness in B feet from neuropathy rather than nerve root compression from his back. He states it's equal B. Anterior Thigh (L3):  Medial Foot (L4):  Dorsal Foot (L5):  Lateral Foot (S1):       CLINICAL PRESENTATION:   CLINICAL DECISION MAKING:   Outcome Measure: Tool Used: Modified Oswestry Low Back Pain Questionnaire  Score:  Initial: 32/50 =64% disability Most Recent: X/50 (Date: -- )   Interpretation of Score: Each section is scored on a 0-5 scale, 5 representing the greatest disability. The scores of each section are added together for a total score of 50. Score 0 1-10 11-20 21-30 31-40 41-49 50   Modifier CH CI CJ CK CL CM CN     ?  Mobility - Walking and Moving Around:     - CURRENT STATUS: CL - 60%-79% impaired, limited or restricted    - GOAL STATUS: CK - 40%-59% impaired, limited or restricted    - D/C STATUS:  ---------------To be determined---------------        Medical Necessity:   · Patient is expected to demonstrate progress in strength, range of motion, balance, coordination and functional technique to decrease assistance required with house old activities , increase independence with HEP and improve safety during gait, walking for exercise and yard work. .  Reason for Services/Other Comments:  · Patient would benefit from skilled PT to improve B LE and core strength, lumbar AROM, balance and gait safety so pt can return to walking for exercise, as well as, shooting basketball. .            TREATMENT:   (In addition to Assessment/Re-Assessment sessions the following treatments were rendered)  Pre-treatment Symptoms/Complaints: Pt reports an increase in R side GT hip pain since last PT visit. He states he's not sure if it's something we did in PT, or he did over the weekend. Pain: Initial:     6/10 Post Session:  3/10     THERAPEUTIC EXERCISE: (45 minutes):  Exercises per grid below to improve mobility, strength, balance and coordination. Required moderate visual, verbal and tactile cues to promote proper body alignment, promote proper body posture, promote proper body mechanics and promote proper body breathing techniques. Progressed resistance, range, repetitions and complexity of movement as indicated.      Date:  8/21/17 Date:  8/25/17   Date:  8/30/17 Date:  9/5/17   Activity/Exercise Parameters Parameters Parameters    Nu-Step L4, 7min L3.5/8.5min L4/10min L4/10min   LTR 20x 20x B On/off green ball, 40x total 40x   SKTC 15x B 10x B 10x B 10x B   DKTC 10x 5x 20x B on/off green ball    TAs w/ pelvic tilt 20 x 5sec 20 x 5sec  20 x 5sec   TAs w/ add sq 20 x 5 sec 20 x 5sec 20 x 8sec 20 x 5 sec   Supine hip ABD w/ TAs and GTB 30x BTB, 30x     S/L hip ABD 25x B 25x B 25xB 30x B   S/L clams 25x B 25x B GTB, 30x B 30x B   SLR   2#, 20x B 2#, 20x B   Sit-stand from low mat 20x  25x 30x 30x   Stdg Marching    2#, 20x B   Stdg hip ABD    2#, 20x B   Stdg hip ext    2#, 20x B   Seated knee Ext  RTB, 25x B     Seated HSC  RTB, 25x B     Cat/Cow stretch in Quadruped  10x     Foam Roll/stick to R hip    5\"   Stdg SLS   3 x 5sec B    Education HEP 2x/daily  TAs with all transitional movements. HEP 2x/daily  TAs/glute sets  HEP 2x daily/TAs/glute sets w/ all act HEP/TAs/glute squeeze with all activities, especially standing/gait         Treatment/Session Assessment: B hip strengthening remains challenging for pt due to weakness B. He requires verbal, visual and tactile cueing to avoid compensatory mechanisms and use of momentum. His gait quality improved after foam roll/stick to R hip. · Response to Treatment:  Pt subjectively  reported less R hip pain after treatment. He states he feels it was SLS that caused an increase in his pain last visit, therefore that was omitted today. · Compliance with Program/Exercises: Pt reports compliance with HEP 1-2x daily. Recommendations/Intent for next treatment session: Continue with core, hip, LE strengthening, preferably in standing to improve function and endurance.    Future Appointments  Date Time Provider Briana Rojo   9/8/2017 11:00 AM Stanley Bautista PT [facility]   9/25/2017 8:00 AM CHELLY Mkceon MD [facility]     Total Treatment Duration:  PT Patient Time In/Time Out  Time In: 1010  Time Out: [address], PT

## 2017-09-08 ENCOUNTER — HOSPITAL ENCOUNTER (OUTPATIENT)
Dept: PHYSICAL THERAPY | Age: 77
Discharge: HOME | End: 2017-09-08
Payer: MEDICARE

## 2017-09-08 PROCEDURE — 97110 THERAPEUTIC EXERCISES: CPT | Performed by: PHYSICAL THERAPIST

## 2017-09-08 NOTE — PROGRESS NOTES
Mary Black  : 1940 [address] [facility] [address], [facility], [address]  [phone]   [phone]         OUTPATIENT PHYSICAL THERAPY:Daily Note 2017    ICD-10: Treatment Diagnosis: (M43. 26) TLIF L3-L5; (M62.81) muscle weakness; (R26.2) Difficulty walking  Precautions/Allergies:   Review of patient's allergies indicates no known allergies. Fall Risk Score: 1 (? 5 = High Risk)  MD Orders: Evaluate and Treat; Modalities, Core strengthening and stretching MEDICAL/REFERRING DIAGNOSIS:  back Z98.1 Arthodesis status  DATE OF ONSET: 17  REFERRING PHYSICIAN: Diana Soriano MD  RETURN PHYSICIAN APPOINTMENT: 17     INITIAL ASSESSMENT:  Mr. Ananya Smalls presents today 7 weeks s/p TLIF L3-L5 and synovial cyst removal at L4 progressing as anticipated. He demonstrates decreased lumbar AROM, as well as, decreased core and LE strength making gait antalgic. He c/o localized lumbar and buttock discomfort and numbness/tingling from the surgery. Pt will benefit from skilled PT to address the following deficits. PROBLEM LIST (Impacting functional limitations):  1. Decreased Strength  2. Decreased ADL/Functional Activities  3. Decreased Transfer Abilities  4. Decreased Ambulation Ability/Technique  5. Decreased Balance  6. Increased Pain  7. Decreased Activity Tolerance  8. Increased Fatigue  9. Decreased Flexibility/Joint Mobility  10. Decreased Knowledge of Precautions  11. Decreased [place] with Home Exercise Program INTERVENTIONS PLANNED:  1. Balance Exercise  2. Bed Mobility  3. Cold  4. Cryotherapy  5. Electrical Stimulation  6. Family Education  7. Gait Training  8. Heat  9. Home Exercise Program (HEP)  10. Manual Therapy  11. Neuromuscular Re-education/Strengthening  12. Range of Motion (ROM)  13. Therapeutic Activites  14. Therapeutic Exercise/Strengthening  15.  Transcutaneous Electrical Nerve Stimulation (TENS)  16. Kinesiotaping   TREATMENT PLAN:  Effective Dates: 8/15/17 TO 10/15/17. Frequency/Duration: 1-2 times a week for 8 weeks  GOALS: (Goals have been discussed and agreed upon with patient.)  Short-Term Functional Goals: Time Frame: 4 weeks (9-15-17)  1. Pt will be compliant and independent with HEP, log rolling and demonstrating correct, erect posture without an increase in LB pain. 2. Pt will improve Oswestry score to 28/50 to increase pt's overall functional mobility. 3. Pt to subjectively report a decrease in pain to 4-5/10 @ worst to increase pt's sitting, standing and walking tolerance. 4. Pt will improve Lumbar AROM to > Flex: 50%, Ext: 10%; and B SB: 25% to increase pt's overall functional mobility. 5. Pt will be able to sit-stand from std height w/out use of UE or compensatory weight shifting to rise. 6. Pt will increase standing/walking tolerance to > 30min (pt will be able to walk . 5 to 1.0miles) with manageable LB pain. 7. Pt will be able to ascend/descend 8\" stairs reciprocally with use of 1 rail for balance. 8. Pt will ambulate with a near normal gait pattern with LRD or No AD. Discharge Goals: Time Frame: 8 weeks (10-15-17)  1. Pt will improve Oswestry score to 20/50 to increase pt's overall functional mobility. 2. Pt will improve FLR from  CL to G [id number] CK to increase pt's overall functional mobility. 3. Pt will subjectively report a decrease in pain to 3/10 @ worst to allow pt to ambulate with a normal gait pattern and increase their overall functional mobility. 4. Pt will improve Lumbar AROM to > Flex: 75%; Ext: 20%: And B SB 50% to increase pt's overall functional mobility. 5. Pt will ambulate with a normal gait pattern with no assistive device. 6. Pt will be discharged from PT to [facility].   Rehabilitation Potential For Stated Goals: Good  Regarding Etelvina Black's therapy, I certify that the treatment plan above will be carried out by a therapist or under their direction. Thank you for this referral,  Stanley Bautista PT     Referring Physician Signature: Willi Ordoñez MD              Date                    The information in this section was collected on 8/15/17 (except where otherwise noted). HISTORY:   History of Present Injury/Illness (Reason for Referral):  Pt reports he's had back pain for > 3 years. He states he tried GUMARO injections and PT, but nothing helped. He states he was evluated by Dr. Elle Tripathi who determined he needed a TLIF at L3-L5. He states he had sx on 6/20/17, and while Dr. Elle Tripathi was in there, he found a synovial cyst at L4. He states overall, his pain has improved, and he is now wearing a bone stimulator for the next 6-9 months. Past Medical History/Comorbidities:   Mr. Stacey Mcdaniels  has a past medical history of Back problem; Chronic pain; Chronic systolic heart failure ([facility] [address] Circulation problem; Colon polyps; Coronary artery disease (08/2010); Gout; Hyperlipidemia; Hypertension; Obesity (BMI 30-39.9); Pain in joint, multiple sites ( ); rheumatoid Arthritis; S/P CABG x 3; and SVT (supraventricular tachycardia) (HCC) ( ). He also has no past medical history of Difficult intubation; Malignant hyperthermia due to anesthesia; Nausea & vomiting; Pseudocholinesterase deficiency; or Unspecified adverse effect of anesthesia. Mr. Stacey Mcdaniels  has a past surgical history that includes colonoscopy; coronary artery bypass graft (2012); cyst removal; cardiac surg procedure unlist (08/2010); cataract removal (Left, 11/15/2016); cataract removal (Right, 11/25/2016); orthopaedic; knee arthroscopy (Bilateral); and back surgery (06/2017). Pt also had a L RCR in 2015. Social History/Living Environment:     Pt reports he lives in a provate home with his wife with 5 steps to enter. He states he required assistance with ADLs, until this week. He states he moves slowly, but is independent.    Prior Level of Function/Work/Activity:  Pt reports he was completely independent with ADLs, house hold and yard work activities. Dominant Side:         LEFT    Current Medications:       Current Outpatient Prescriptions:     HYDROcodone-homatropine (HYCODAN) 5-1.5 mg/5 mL (5 mL) syrup, Take 5 mL by mouth four (4) times daily. Max Daily Amount: 20 mL., Disp: 100 mL, Rfl: 0    predniSONE (DELTASONE) 10 mg tablet, 2x5d, 1x5d, Disp: 15 Tab, Rfl: 0    gabapentin (NEURONTIN) 600 mg tablet, Take 1 tablet by mouth  daily, Disp: 90 Tab, Rfl: 3    aspirin delayed-release 81 mg tablet, Take 81 mg by mouth daily. , Disp: , Rfl:     allopurinol (ZYLOPRIM) 300 mg tablet, Take 1 Tab by mouth nightly. Indications: URIC ACID NEPHROPATHY GOUT, Disp: 90 Tab, Rfl: 3    atorvastatin (LIPITOR) 10 mg tablet, Take 1 Tab by mouth nightly. Indications: hypercholestermia, Disp: 90 Tab, Rfl: 3    finasteride (PROSCAR) 5 mg tablet, Take 1 Tab by mouth every evening. Indications: BENIGN PROSTATIC HYPERPLASIA, Disp: 90 Tab, Rfl: 3    lisinopril (PRINIVIL, ZESTRIL) 10 mg tablet, Take 1 tab po daily  Indications: hypertension, Disp: 90 Tab, Rfl: 3    meloxicam (MOBIC) 7.5 mg tablet, Take 1 Tab by mouth daily. , Disp: 90 Tab, Rfl: 3    tamsulosin (FLOMAX) 0.4 mg capsule, Take 1 Cap by mouth every evening. Indications: BENIGN PROSTATIC HYPERPLASIA, Disp: 90 Cap, Rfl: 3    omega-3 fatty acids-vitamin e (FISH OIL) 1,000 mg cap, Take 1 Cap by mouth daily. Indications: hold until after surgery, Disp: , Rfl:     METHYL-B12/L-MEFOLATE/B6 PHOS (METANX PO), Take  by mouth two (2) times a day. Indications: energy supplement, Disp: , Rfl:     Cholecalciferol, Vitamin D3, (VITAMIN D3) 1,000 unit cap, Take 2,000 Units by mouth every morning. Indications: PREVENTION OF VITAMIN D DEFICIENCY, Disp: , Rfl:    Date Last Reviewed:  8/15/17   EXAMINATION:   Observation/Orthostatic Postural Assessment:          Pt stands with slightly L trunk lean and forward flexion.    Palpation:          Pt is moderately tender with palpation along his incision, especially at L4-L5. Posture/Deformity:  Lumbar AROM: % of normal motion in standing    Lumbar spine Date:  8/15/17 Date:   Date:     Direction  Parameters Parameters Parameters   Flexion  25%     Extension  5% w/ pain     Rotation  R: NT  L: NT     Side bending  R: 15%  L:15%     Hip R:WFL  L:WFL                   Strength Date:  8/15/17 Date:   Date:     Core/LE Parameters Parameters Parameters   Hip Flex R:3/5  L:3+/5     Hip Ext R:3-/5  L:3-/5     Hip ABD R:3-/5  L:3-/5     Hip ADD R:3/5  L:3+/5     Hip ER R:3-/5  L:3-/5     Knee Ext R:4+/5  L:4+/5     Knee Flex R:4/5  L:4/5     Ankle DF R:4/5  L:4/5     Ankle PF R:4/5  L:4/5     Ankle INV R:3/5  L:3/5     Ankle EV R:3/5  L:3/5     Double Squat (L4) NT due to acuteness of surgery     Heel Walk (L5) NT due to acuteness of surgery     Toe Walk (S1) NT due to acuteness of surgery       Myotomes: All intact; R delayed > than L  Lower Abdominals (L1):  Iliopsoas (L2):  Quadriceps (L3):  Anterior Tibialis (L4):  Extensor Hallicus Longus (L5):  Gastrocnemius (S1):    Sensation: pt reports numbness in B feet from neuropathy rather than nerve root compression from his back. He states it's equal B. Anterior Thigh (L3):  Medial Foot (L4):  Dorsal Foot (L5):  Lateral Foot (S1):       CLINICAL PRESENTATION:   CLINICAL DECISION MAKING:   Outcome Measure: Tool Used: Modified Oswestry Low Back Pain Questionnaire  Score:  Initial: 32/50 =64% disability Most Recent: X/50 (Date: -- )   Interpretation of Score: Each section is scored on a 0-5 scale, 5 representing the greatest disability. The scores of each section are added together for a total score of 50. Score 0 1-10 11-20 21-30 31-40 41-49 50   Modifier CH CI CJ CK CL CM CN     ?  Mobility - Walking and Moving Around:     - CURRENT STATUS: CL - 60%-79% impaired, limited or restricted    - GOAL STATUS: CK - 40%-59% impaired, limited or restricted    - D/C STATUS:  ---------------To be determined---------------        Medical Necessity:   · Patient is expected to demonstrate progress in strength, range of motion, balance, coordination and functional technique to decrease assistance required with house old activities , increase independence with HEP and improve safety during gait, walking for exercise and yard work. .  Reason for Services/Other Comments:  · Patient would benefit from skilled PT to improve B LE and core strength, lumbar AROM, balance and gait safety so pt can return to walking for exercise, as well as, shooting basketball. .            TREATMENT:   (In addition to Assessment/Re-Assessment sessions the following treatments were rendered)  Pre-treatment Symptoms/Complaints: Pt reports R hip pain continues, but is much less than it was at last visit. Pain: Initial:     4/10 Post Session:  2/10     THERAPEUTIC EXERCISE: (55 minutes):  Exercises per grid below to improve mobility, strength, balance and coordination. Required moderate visual, verbal and tactile cues to promote proper body alignment, promote proper body posture, promote proper body mechanics and promote proper body breathing techniques. Progressed resistance, range, repetitions and complexity of movement as indicated.      Date:  8/21/17 Date:  8/25/17   Date:  8/30/17 Date:  9/5/17 Date:  9/8/17   Activity/Exercise Parameters Parameters Parameters     Nu-Step L4, 7min L3.5/8.5min L4/10min L4/10min L4/10min   LTR 20x 20x B On/off green ball, 40x total 40x    SKTC 15x B 10x B 10x B 10x B    DKTC 10x 5x 20x B on/off green ball     TAs w/ pelvic tilt 20 x 5sec 20 x 5sec  20 x 5sec    TAs w/ add sq 20 x 5 sec 20 x 5sec 20 x 8sec 20 x 5 sec    Supine hip ABD w/ TAs and GTB 30x BTB, 30x      S/L hip ABD 25x B 25x B 25xB 30x B 30x B   S/L clams 25x B 25x B GTB, 30x B 30x B 30x B   SLR   2#, 20x B 2#, 20x B 30x   Sit-stand from low mat 20x  25x 30x 30x 30x L LE bias   Stdg Marching    2#, 20x B    Stdg hip ABD    2#, 20x B Stdg hip ext    2#, 20x B    Seated knee Ext  RTB, 25x B      Seated HSC  RTB, 25x B      Cat/Cow stretch in Quadruped  10x      Stdg rockerboard (f/b and side)     6\"   3\" box step-overs (forward)     30x B   3\" box step-overs (lateral)     30x B           Foam Roll/stick to R hip    5\" 8\" B hips   Stdg SLS   3 x 5sec B  10 x 10sec B   Education HEP 2x/daily  TAs with all transitional movements. HEP 2x/daily  TAs/glute sets  HEP 2x daily/TAs/glute sets w/ all act HEP/TAs/glute squeeze with all activities, especially standing/gait          Treatment/Session Assessment: Pt required verbal, visual and tactile cueing  when stepping over 3\" box obstacle to safely clear feet without compensatory mechanisms. His quality improved with increased repetitions indicating improving muscle memory and balance. Better quality with S/L clams and ABD today indicating improving strength. Pt is progressing toward current goals. · Response to Treatment:  Pt with less R hip pain, tightness and tenderness during and after treatment today. · Compliance with Program/Exercises: Pt reports compliance with HEP 1-2x daily. Recommendations/Intent for next treatment session: Continue with core, hip, LE strengthening, preferably in standing to improve function and endurance. Re-assess next week.  MD f/u 9/19/17  Future Appointments  Date Time Provider Briana Rojo   9/25/2017 8:00 AM CHELLY Harrison MD [facility]     Total Treatment Duration:  PT Patient Time In/Time Out  Time In: 1050  Time Out: Neha Lopez, PT

## 2017-09-15 ENCOUNTER — HOSPITAL ENCOUNTER (OUTPATIENT)
Dept: PHYSICAL THERAPY | Age: 77
Discharge: HOME | End: 2017-09-15
Payer: MEDICARE

## 2017-09-15 PROCEDURE — G8978 MOBILITY CURRENT STATUS: HCPCS | Performed by: PHYSICAL THERAPIST

## 2017-09-15 PROCEDURE — 97110 THERAPEUTIC EXERCISES: CPT | Performed by: PHYSICAL THERAPIST

## 2017-09-15 PROCEDURE — G8979 MOBILITY GOAL STATUS: HCPCS | Performed by: PHYSICAL THERAPIST

## 2017-09-15 NOTE — PROGRESS NOTES
Randall lBack  : 1940 [address] at [address] [facility]. [address], [place], [address] [address]  [phone]   [phone]         OUTPATIENT PHYSICAL THERAPY:Progress Report 9/15/2017    ICD-10: Treatment Diagnosis: (M43. 26) TLIF L3-L5; (M62.81) muscle weakness; (R26.2) Difficulty walking  Precautions/Allergies:   Review of patient's allergies indicates no known allergies. Fall Risk Score: 1 (? 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 Carlita Hatch has done well with PT, meeting all short term goals and some long term goals listed below. His lumbar AROM has improved, and remains most restricted into extension. His score on the Oswestry improved by 10 points, and he is now at a 44% disability rating rather than 64%. He states his LBP and R hip pain are 3/10 on average, and occasionally increases to a 4/10. He states he was able to put out mulch x 2 hours with only a min increase in pain. Pt is compliant and independent with his HEP. Pt would benefit from continued PT for hip strength and balance as those are his greatest deficits, if you deem it necessary for him to continue, otherwise I will DC him to HEP. PROBLEM LIST (Impacting functional limitations):  1. Decreased Strength  2. Decreased ADL/Functional Activities  3. Decreased Transfer Abilities  4. Decreased Ambulation Ability/Technique  5. Decreased Balance  6. Increased Pain  7. Decreased Activity Tolerance  8. Increased Fatigue  9. Decreased Flexibility/Joint Mobility  10. Decreased Knowledge of Precautions  11. Decreased Hinds with Home Exercise Program INTERVENTIONS PLANNED:  1. Balance Exercise  2. Bed Mobility  3. Cold  4. Cryotherapy  5.  Electrical Stimulation  6. Family Education  7. Gait Training  8. Heat  9. Home Exercise Program (HEP)  10. Manual Therapy  11. Neuromuscular Re-education/Strengthening  12. Range of Motion (ROM)  13. Therapeutic Activites  14. Therapeutic Exercise/Strengthening  15. Transcutaneous Electrical Nerve Stimulation (TENS)  16. Kinesiotaping   TREATMENT PLAN:  Effective Dates: 8/15/17 TO 10/15/17. Frequency/Duration: 1-2 times a week for 8 weeks  GOALS: (Goals have been discussed and agreed upon with patient.)  Short-Term Functional Goals: Time Frame: 4 weeks (9-15-17)  1. Pt will be compliant and independent with HEP, log rolling and demonstrating correct, erect posture without an increase in LB pain.--------------------------------------MET  2. Pt will improve Oswestry score to 28/50 to increase pt's overall functional mobility.------------------------------------------------------------------------------------------------------------MET; pt scored 22/50  3. Pt to subjectively report a decrease in pain to 4-5/10 @ worst to increase pt's sitting, standing and walking tolerance. ---------------------------------------------------------MET  4. Pt will improve Lumbar AROM to > Flex: 50%, Ext: 10%; and B SB: 25% to increase pt's overall functional mobility. ---------------------------------MET, see below  5. Pt will be able to sit-stand from std height w/out use of UE or compensatory weight shifting to rise.-------------------------------------------------------MET  6. Pt will increase standing/walking tolerance to > 30min (pt will be able to walk . 5 to 1.0miles) with manageable LB pain.------------------------------MET  7. Pt will be able to ascend/descend 8\" stairs reciprocally with use of 1 rail for balance. ------------------------------------------------------------------------MET  8. Pt will ambulate with a near normal gait pattern with LRD or No AD. ----------------------------------------------------------------------------------------------MET  Discharge Goals: Time Frame: 8 weeks (10-15-17)  1. Pt will improve Oswestry score to 20/50 to increase pt's overall functional mobility.------------------------------------------------------------------------------------------------------NOT MET  2. Pt will improve FLR from  CL to G 8979 CK to increase pt's overall functional mobility.---------------------------------------------------------------------------------------------MET  3. Pt will subjectively report a decrease in pain to 3/10 @ worst to allow pt to ambulate with a normal gait pattern and increase their overall functional mobility. ---MET  4. Pt will improve Lumbar AROM to > Flex: 75%; Ext: 20%: And B SB 50% to increase pt's overall functional mobility. 5. Pt will ambulate with a normal gait pattern with no assistive device. 6. Pt will be discharged from PT to [facility]. Rehabilitation Potential For Stated Goals: Good  Regarding Edson Black's therapy, I certify that the treatment plan above will be carried out by a therapist or under their direction. Thank you for this referral,  Barabara Oz, PT     Referring Physician Signature: Mar Lucas MD              Date                    The information in this section was collected on 8/15/17 (except where otherwise noted). HISTORY:   History of Present Injury/Illness (Reason for Referral):  Pt reports he's had back pain for > 3 years. He states he tried GUMARO injections and PT, but nothing helped. He states he was evluated by Dr. Steve Sanchez who determined he needed a TLIF at L3-L5. He states he had sx on 6/20/17, and while Dr. Steve Sanchez was in there, he found a synovial cyst at L4. He states overall, his pain has improved, and he is now wearing a bone stimulator for the next 6-9 months.   Past Medical History/Comorbidities:   Mr. Ricardo Rushing  has a past medical history of Back problem; Chronic pain; Chronic systolic heart failure ([facility] [address] Circulation problem; Colon polyps; Coronary artery disease (08/2010); Gout; Hyperlipidemia; Hypertension; Obesity (BMI 30-39.9); Pain in joint, multiple sites ( ); rheumatoid Arthritis; S/P CABG x 3; and SVT (supraventricular tachycardia) (HCC) ( ). He also has no past medical history of Difficult intubation; Malignant hyperthermia due to anesthesia; Nausea & vomiting; Pseudocholinesterase deficiency; or Unspecified adverse effect of anesthesia. Mr. Stacey Mcdaniels  has a past surgical history that includes colonoscopy; coronary artery bypass graft (2012); cyst removal; cardiac surg procedure unlist (08/2010); cataract removal (Left, 11/15/2016); cataract removal (Right, 11/25/2016); orthopaedic; knee arthroscopy (Bilateral); and back surgery (06/2017). Pt also had a L RCR in 2015. Social History/Living Environment:     Pt reports he lives in a provate home with his wife with 5 steps to enter. He states he required assistance with ADLs, until this week. He states he moves slowly, but is independent. Prior Level of Function/Work/Activity:  Pt reports he was completely independent with ADLs, house hold and yard work activities. Dominant Side:         LEFT    Current Medications:       Current Outpatient Prescriptions:     HYDROcodone-homatropine (HYCODAN) 5-1.5 mg/5 mL (5 mL) syrup, Take 5 mL by mouth four (4) times daily. Max Daily Amount: 20 mL., Disp: 100 mL, Rfl: 0    predniSONE (DELTASONE) 10 mg tablet, 2x5d, 1x5d, Disp: 15 Tab, Rfl: 0    gabapentin (NEURONTIN) 600 mg tablet, Take 1 tablet by mouth  daily, Disp: 90 Tab, Rfl: 3    aspirin delayed-release 81 mg tablet, Take 81 mg by mouth daily. , Disp: , Rfl:     allopurinol (ZYLOPRIM) 300 mg tablet, Take 1 Tab by mouth nightly. Indications: URIC ACID NEPHROPATHY GOUT, Disp: 90 Tab, Rfl: 3    atorvastatin (LIPITOR) 10 mg tablet, Take 1 Tab by mouth nightly.  Indications: hypercholestermia, Disp: 90 Tab, Rfl: 3    finasteride (PROSCAR) 5 mg tablet, Take 1 Tab by mouth every evening. Indications: BENIGN PROSTATIC HYPERPLASIA, Disp: 90 Tab, Rfl: 3    lisinopril (PRINIVIL, ZESTRIL) 10 mg tablet, Take 1 tab po daily  Indications: hypertension, Disp: 90 Tab, Rfl: 3    meloxicam (MOBIC) 7.5 mg tablet, Take 1 Tab by mouth daily. , Disp: 90 Tab, Rfl: 3    tamsulosin (FLOMAX) 0.4 mg capsule, Take 1 Cap by mouth every evening. Indications: BENIGN PROSTATIC HYPERPLASIA, Disp: 90 Cap, Rfl: 3    omega-3 fatty acids-vitamin e (FISH OIL) 1,000 mg cap, Take 1 Cap by mouth daily. Indications: hold until after surgery, Disp: , Rfl:     METHYL-B12/L-MEFOLATE/B6 PHOS (METANX PO), Take  by mouth two (2) times a day. Indications: energy supplement, Disp: , Rfl:     Cholecalciferol, Vitamin D3, (VITAMIN D3) 1,000 unit cap, Take 2,000 Units by mouth every morning. Indications: PREVENTION OF VITAMIN D DEFICIENCY, Disp: , Rfl:    Date Last Reviewed:  8/15/17   EXAMINATION:   Observation/Orthostatic Postural Assessment:          Pt stands with slightly L trunk lean and forward flexion. Palpation:          Pt is moderately tender with palpation along his incision, especially at L4-L5.     Posture/Deformity:  Lumbar AROM: % of normal motion in standing    Lumbar spine Date:  8/15/17 Date:  9/15/17 Date:     Direction  Parameters Parameters Parameters   Flexion  25% 75%    Extension  5% w/ pain 10% w/ min pain    Rotation  R: NT  L: NT 25% B    Side bending  R: 15%  L:15% 25% B    Hip R:WFL  L:WFL                   Strength Date:  8/15/17 Date:   Date:     Core/LE Parameters Parameters Parameters   Hip Flex R:3/5  L:3+/5     Hip Ext R:3-/5  L:3-/5     Hip ABD R:3-/5  L:3-/5     Hip ADD R:3/5  L:3+/5     Hip ER R:3-/5  L:3-/5     Knee Ext R:4+/5  L:4+/5     Knee Flex R:4/5  L:4/5     Ankle DF R:4/5  L:4/5     Ankle PF R:4/5  L:4/5     Ankle INV R:3/5  L:3/5     Ankle EV R:3/5  L:3/5     Double Squat (L4) NT due to acuteness of surgery     Heel Walk (L5) NT due to acuteness of surgery     Toe Walk (S1) NT due to acuteness of surgery       Myotomes: All intact; R delayed > than L  Lower Abdominals (L1):  Iliopsoas (L2):  Quadriceps (L3):  Anterior Tibialis (L4):  Extensor Hallicus Longus (L5):  Gastrocnemius (S1):    Sensation: pt reports numbness in B feet from neuropathy rather than nerve root compression from his back. He states it's equal B. Anterior Thigh (L3):  Medial Foot (L4):  Dorsal Foot (L5):  Lateral Foot (S1):       CLINICAL PRESENTATION:   CLINICAL DECISION MAKING:   Outcome Measure: Tool Used: Modified Oswestry Low Back Pain Questionnaire  Score:  Initial: 32/50 =64% disability Most Recent: 22/50= 44% disability (Date: 9/15/17)   Interpretation of Score: Each section is scored on a 0-5 scale, 5 representing the greatest disability. The scores of each section are added together for a total score of 50. Score 0 1-10 11-20 21-30 31-40 41-49 50   Modifier CH CI CJ CK CL CM CN     ? Mobility - Walking and Moving Around:     - CURRENT STATUS: CK - 40%-59% impaired, limited or restricted    - GOAL STATUS: CK - 40%-59% impaired, limited or restricted    - D/C STATUS:  ---------------To be determined---------------        Medical Necessity:   · Patient is expected to demonstrate progress in strength, range of motion, balance, coordination and functional technique to decrease assistance required with house old activities , increase independence with HEP and improve safety during gait, walking for exercise and yard work. .  Reason for Services/Other Comments:  · Patient would benefit from skilled PT to improve B LE and core strength, lumbar AROM, balance and gait safety so pt can return to walking for exercise, as well as, shooting basketball. .            TREATMENT:   (In addition to Assessment/Re-Assessment sessions the following treatments were rendered)  Pre-treatment Symptoms/Complaints: Pt reports minimal LBP and R hip soreness today.    Pain: Initial:     2-3/10 Post Session:  2/10     THERAPEUTIC EXERCISE: (55 minutes):  Exercises per grid below to improve mobility, strength, balance and coordination. Required moderate visual, verbal and tactile cues to promote proper body alignment, promote proper body posture, promote proper body mechanics and promote proper body breathing techniques. Progressed resistance, range, repetitions and complexity of movement as indicated. Date:  8/25/17   Date:  8/30/17 Date:  9/5/17 Date:  9/8/17 Date:  9/15/17   Activity/Exercise Parameters Parameters      Nu-Step L3.5/8.5min L4/10min L4/10min L4/10min L4/10min   LTR 20x B On/off green ball, 40x total 40x  30x   SKTC 10x B 10x B 10x B     DKTC 5x 20x B on/off green ball      TAs w/ pelvic tilt 20 x 5sec  20 x 5sec     TAs w/ add sq 20 x 5sec 20 x 8sec 20 x 5 sec  20 x 5sec   Supine hip ABD w/ TAs and GTB BTB, 30x       S/L hip ABD 25x B 25xB 30x B 30x B 30x B; OTB   S/L clams 25x B GTB, 30x B 30x B 30x B 30x B; OTB   SLR  2#, 20x B 2#, 20x B 30x 30x B   Sit-stand from low mat 25x 30x 30x 30x L LE bias 30x   Stdg Marching   2#, 20x B     Stdg hip ABD   2#, 20x B     Stdg hip ext   2#, 20x B     Seated knee Ext RTB, 25x B       Seated HSC RTB, 25x B       Cat/Cow stretch in Quadruped 10x       Stdg rockerboard (f/b and side)    6\"    3\" box step-overs (forward)    30x B    3\" box step-overs (lateral)    30x B    8\" stp-overs     20x B   Foam Roll/stick to R hip   5\" 8\" B hips 8\"   stdg calf stretch on board     3 x 30sec   Stdg SLS  3 x 5sec B  10 x 10sec B 10 x 10sec   Education HEP 2x/daily  TAs/glute sets  HEP 2x daily/TAs/glute sets w/ all act HEP/TAs/glute squeeze with all activities, especially standing/gait  HEP/TAs/GS w/ all activities         Treatment/Session Assessment: . See above  · Response to Treatment:  Pt with less R hip pain, tightness and tenderness during and after treatment today.    · Compliance with Program/Exercises: Pt reports compliance with HEP 1-2x daily. Recommendations/Intent for next treatment session: Progress hip strength and balance if MD orders additional PT, otherwise DC to HEP.    Future Appointments  Date Time Provider Briana Alia   9/25/2017 8:00 AM CHELLY Chakraborty MD [facility]     Total Treatment Duration:  PT Patient Time In/Time Out  Time In: 1000  Time Out: [address] Montse Miller, PT

## 2017-09-25 PROBLEM — N18.1 CHRONIC KIDNEY DISEASE (CKD), STAGE I: Status: ACTIVE | Noted: 2017-09-25

## 2017-09-25 PROBLEM — D62 ACUTE BLOOD LOSS ANEMIA: Status: RESOLVED | Noted: 2017-06-21 | Resolved: 2017-09-25

## 2017-09-25 PROBLEM — I95.9 HYPOTENSION: Status: RESOLVED | Noted: 2017-06-21 | Resolved: 2017-09-25

## 2017-10-06 NOTE — PROGRESS NOTES
Tami Black  : 1940 [id number] [address] at [address] [facility]. [facility], [address] [place] [address], [facility], [address] [place] [address]  [phone]   [phone]         OUTPATIENT PHYSICAL THERAPY:Discharge 10/6/2017    ICD-10: Treatment Diagnosis: (M43. 26) TLIF L3-L5; (M62.81) muscle weakness; (R26.2) Difficulty walking  Precautions/Allergies:   Review of patient's allergies indicates no known allergies. Fall Risk Score: 1 (? 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 Parth Russo has done well with PT, meeting all short term goals and some long term goals listed below. He has been seen x 7 visits. His lumbar AROM has improved, and remains most restricted into extension. His score on the Oswestry improved by 10 points, and he is now at a 44% disability rating rather than 64%. He states his LBP and R hip pain are 3/10 on average, and occasionally increases to a 4/10. He states he was able to put out mulch x 2 hours with only a min increase in pain. Pt is compliant and independent with his HEP. Pt did not return to PT after his MD appointment x 2 weeks ago, and has not called to make further appointments. Pt is DC'd from PT to HEP at this time   TREATMENT PLAN:  Effective Dates: 8/15/17 TO 10/15/17. Frequency/Duration: 1-2 times a week for 8 weeks  GOALS: (Goals have been discussed and agreed upon with patient.)  Short-Term Functional Goals: Time Frame: 4 weeks (9-15-17)  1. Pt will be compliant and independent with HEP, log rolling and demonstrating correct, erect posture without an increase in LB pain.--------------------------------------MET  2.  Pt will improve Oswestry score to 28/50 to increase pt's overall functional mobility.------------------------------------------------------------------------------------------------------------MET; pt scored 22/50  3. Pt to subjectively report a decrease in pain to 4-5/10 @ worst to increase pt's sitting, standing and walking tolerance. ---------------------------------------------------------MET  4. Pt will improve Lumbar AROM to > Flex: 50%, Ext: 10%; and B SB: 25% to increase pt's overall functional mobility. ---------------------------------MET  5. Pt will be able to sit-stand from std height w/out use of UE or compensatory weight shifting to rise.-------------------------------------------------------MET  6. Pt will increase standing/walking tolerance to > 30min (pt will be able to walk . 5 to 1.0miles) with manageable LB pain.------------------------------MET  7. Pt will be able to ascend/descend 8\" stairs reciprocally with use of 1 rail for balance. ------------------------------------------------------------------------MET  8. Pt will ambulate with a near normal gait pattern with LRD or No AD. ----------------------------------------------------------------------------------------------MET  Discharge Goals: Time Frame: 8 weeks (10-15-17)  1. Pt will improve Oswestry score to 20/50 to increase pt's overall functional mobility.------------------------------------------------------------------------------------------------------NOT MET  2. Pt will improve FLR from  CL to G 8979 CK to increase pt's overall functional mobility.---------------------------------------------------------------------------------------------MET  3. Pt will subjectively report a decrease in pain to 3/10 @ worst to allow pt to ambulate with a normal gait pattern and increase their overall functional mobility. ---MET  4. Pt will improve Lumbar AROM to > Flex: 75%; Ext: 20%: And B SB 50% to increase pt's overall functional mobility.-----------------------------------------------Partially MET  5.  Pt will ambulate with a normal gait pattern with no assistive device.--------------------------------------------------------------------------------------------------------------------------------MET  6. Pt will be discharged from PT to HEP.--------------------------------------------------------------------------------------------------------------------------------------------------------------------------MET    Micki Bland PT                 The information in this section was collected on 8/15/17 (except where otherwise noted). Outcome Measure: Tool Used: Modified Oswestry Low Back Pain Questionnaire  Score:  Initial: 32/50 =64% disability Most Recent: 22/50= 44% disability (Date: 9/15/17)   Interpretation of Score: Each section is scored on a 0-5 scale, 5 representing the greatest disability. The scores of each section are added together for a total score of 50. Score 0 1-10 11-20 21-30 31-40 41-49 50   Modifier CH CI CJ CK CL CM CN     ?  Mobility - Walking and Moving Around:     - CURRENT STATUS: CK - 40%-59% impaired, limited or restricted    - GOAL STATUS: CK - 40%-59% impaired, limited or restricted    - D/C STATUS:  CK - 40%-59% impaired, limited or restricted

## 2018-03-27 PROBLEM — E66.01 SEVERE OBESITY (BMI 35.0-39.9) WITH COMORBIDITY (HCC): Status: ACTIVE | Noted: 2018-03-27

## 2018-04-04 ENCOUNTER — HOSPITAL ENCOUNTER (EMERGENCY)
Age: 78
Discharge: HOME | End: 2018-04-04
Attending: EMERGENCY MEDICINE
Payer: MEDICARE

## 2018-04-04 ENCOUNTER — APPOINTMENT (OUTPATIENT)
Dept: CT IMAGING | Age: 78
End: 2018-04-04
Attending: EMERGENCY MEDICINE
Payer: MEDICARE

## 2018-04-04 VITALS
WEIGHT: 300 LBS | OXYGEN SATURATION: 95 % | TEMPERATURE: 97.7 F | BODY MASS INDEX: 36.53 KG/M2 | SYSTOLIC BLOOD PRESSURE: 188 MMHG | HEART RATE: 81 BPM | HEIGHT: 76 IN | RESPIRATION RATE: 16 BRPM | DIASTOLIC BLOOD PRESSURE: 92 MMHG

## 2018-04-04 DIAGNOSIS — R20.0 NUMBNESS: Primary | ICD-10-CM

## 2018-04-04 LAB
ALBUMIN SERPL-MCNC: 3.7 G/DL (ref 3.2–4.6)
ALBUMIN/GLOB SERPL: 0.9 {RATIO} (ref 1.2–3.5)
ALP SERPL-CCNC: 102 U/L (ref 50–136)
ALT SERPL-CCNC: 17 U/L (ref 12–65)
ANION GAP SERPL CALC-SCNC: 9 MMOL/L (ref 7–16)
AST SERPL-CCNC: 22 U/L (ref 15–37)
ATRIAL RATE: 86 BPM
BASOPHILS # BLD: 0 K/UL (ref 0–0.2)
BASOPHILS NFR BLD: 0 % (ref 0–2)
BILIRUB SERPL-MCNC: 0.3 MG/DL (ref 0.2–1.1)
BUN SERPL-MCNC: 32 MG/DL (ref 8–23)
CALCIUM SERPL-MCNC: 9.3 MG/DL (ref 8.3–10.4)
CALCULATED P AXIS, ECG09: 24 DEGREES
CALCULATED R AXIS, ECG10: -11 DEGREES
CALCULATED T AXIS, ECG11: 35 DEGREES
CHLORIDE SERPL-SCNC: 109 MMOL/L (ref 98–107)
CO2 SERPL-SCNC: 25 MMOL/L (ref 21–32)
CREAT SERPL-MCNC: 1.34 MG/DL (ref 0.8–1.5)
DIAGNOSIS, 93000: NORMAL
DIFFERENTIAL METHOD BLD: ABNORMAL
EOSINOPHIL # BLD: 0.2 K/UL (ref 0–0.8)
EOSINOPHIL NFR BLD: 3 % (ref 0.5–7.8)
ERYTHROCYTE [DISTWIDTH] IN BLOOD BY AUTOMATED COUNT: 16.2 % (ref 11.9–14.6)
GLOBULIN SER CALC-MCNC: 4 G/DL (ref 2.3–3.5)
GLUCOSE SERPL-MCNC: 90 MG/DL (ref 65–100)
HCT VFR BLD AUTO: 40.9 % (ref 41.1–50.3)
HGB BLD-MCNC: 13.3 G/DL (ref 13.6–17.2)
IMM GRANULOCYTES # BLD: 0 K/UL (ref 0–0.5)
IMM GRANULOCYTES NFR BLD AUTO: 0 % (ref 0–5)
LYMPHOCYTES # BLD: 2 K/UL (ref 0.5–4.6)
LYMPHOCYTES NFR BLD: 25 % (ref 13–44)
MCH RBC QN AUTO: 28.6 PG (ref 26.1–32.9)
MCHC RBC AUTO-ENTMCNC: 32.5 G/DL (ref 31.4–35)
MCV RBC AUTO: 88 FL (ref 79.6–97.8)
MONOCYTES # BLD: 0.7 K/UL (ref 0.1–1.3)
MONOCYTES NFR BLD: 9 % (ref 4–12)
NEUTS SEG # BLD: 5 K/UL (ref 1.7–8.2)
NEUTS SEG NFR BLD: 63 % (ref 43–78)
P-R INTERVAL, ECG05: 176 MS
PLATELET # BLD AUTO: 264 K/UL (ref 150–450)
PMV BLD AUTO: 10.6 FL (ref 10.8–14.1)
POTASSIUM SERPL-SCNC: 4.5 MMOL/L (ref 3.5–5.1)
PROT SERPL-MCNC: 7.7 G/DL (ref 6.3–8.2)
Q-T INTERVAL, ECG07: 362 MS
QRS DURATION, ECG06: 100 MS
QTC CALCULATION (BEZET), ECG08: 433 MS
RBC # BLD AUTO: 4.65 M/UL (ref 4.23–5.67)
SODIUM SERPL-SCNC: 143 MMOL/L (ref 136–145)
VENTRICULAR RATE, ECG03: 86 BPM
WBC # BLD AUTO: 7.9 K/UL (ref 4.3–11.1)

## 2018-04-04 PROCEDURE — 80053 COMPREHEN METABOLIC PANEL: CPT | Performed by: EMERGENCY MEDICINE

## 2018-04-04 PROCEDURE — 85025 COMPLETE CBC W/AUTO DIFF WBC: CPT | Performed by: EMERGENCY MEDICINE

## 2018-04-04 PROCEDURE — 70450 CT HEAD/BRAIN W/O DYE: CPT

## 2018-04-04 PROCEDURE — 99285 EMERGENCY DEPT VISIT HI MDM: CPT | Performed by: EMERGENCY MEDICINE

## 2018-04-04 PROCEDURE — 93005 ELECTROCARDIOGRAM TRACING: CPT | Performed by: EMERGENCY MEDICINE

## 2018-04-04 NOTE — ED PROVIDER NOTES
HPI Comments: Patient is a 43-year-old male with a history of heart disease and a CABG who presents to the ER today complaining of an episode of left arm numbness and jerking which started this afternoon while seated in a chair. He also had a mild headache. He denies any injury. He denies any loss of consciousness. He denies bowel or bladder incontinence. He denies any leg weakness. He denies any speech changes. Patient is a 76 y.o. male presenting with numbness. The history is provided by the patient. Numbness   This is a new problem. The current episode started 1 to 2 hours ago. The problem has been resolved. There was left upper extremity focality noted. Primary symptoms include loss of sensation. Pertinent negatives include no focal weakness, no slurred speech, no mental status change and no unresponsiveness. There has been no fever. Associated symptoms include headaches. Pertinent negatives include no shortness of breath, no chest pain, no vomiting, no nausea and no bladder incontinence. Associated medical issues do not include trauma or CVA.         Past Medical History:   Diagnosis Date    Back problem     Chronic pain     Chronic systolic heart failure (HCC)     ef 40-45% echo 5/14    Circulation problem     Colon polyps     Coronary artery disease 08/2010    CABG X3 VESSELS-had  a flutter 10/2010 and cardioverted    Gout     Hyperlipidemia     Hypertension     Obesity (BMI 30-39.9)     35.5    Pain in joint, multiple sites      rheumatoid Arthritis     S/P CABG x 3     SVT (supraventricular tachycardia) ([facility] [address]         Past Surgical History:   Procedure Laterality Date    CARDIAC SURG PROCEDURE UNLIST  08/2010    CABG-had a flutter 2 mo after    COLONOSCOPY      HX BACK SURGERY  06/2017    HX CATARACT REMOVAL Left 11/15/2016    HX CATARACT REMOVAL Right 11/25/2016    HX CORONARY ARTERY BYPASS GRAFT  2012    x 3 vessels    HX CYST REMOVAL      pilonidial    HX KNEE ARTHROSCOPY Bilateral     HX ORTHOPAEDIC      open left shoulder         Family History:   Problem Relation Age of Onset    Cancer Mother      ? type    Cancer Father      throat    No Known Problems Brother     No Known Problems Brother        Social History     Social History    Marital status:      Spouse name: N/A    Number of children: N/A    Years of education: N/A     Occupational History    retired           Social History Main Topics    Smoking status: Never Smoker    Smokeless tobacco: Never Used    Alcohol use 0.0 oz/week     0 Standard drinks or equivalent per week      Comment: social    Drug use: No    Sexual activity: Not on file     Other Topics Concern    Not on file     Social History Narrative    , lives with wife. Moved here about 1 year ago from [facility] [address]: Review of patient's allergies indicates no known allergies. Review of Systems   Constitutional: Negative. HENT: Negative. Eyes: Negative. Respiratory: Negative for shortness of breath. Cardiovascular: Negative for chest pain. Gastrointestinal: Negative for nausea and vomiting. Endocrine: Negative. Genitourinary: Negative. Negative for bladder incontinence. Musculoskeletal: Negative. Skin: Negative. Neurological: Positive for numbness and headaches. Negative for focal weakness. Vitals:    04/04/18 1532 04/04/18 1608   BP: (!) 147/95 175/87   Pulse: 88 75   Resp: 16    Temp: 97.7 °F (36.5 °C)    SpO2: 93% 95%   Weight: 136.1 kg (300 lb)    Height: 6' 4\" (1.93 m)             Physical Exam   Constitutional: He is oriented to person, place, and time. He appears well-developed and well-nourished. HENT:   Head: Normocephalic and atraumatic. Eyes: Conjunctivae and EOM are normal. Pupils are equal, round, and reactive to light. Neck: Normal range of motion. Neck supple. Cardiovascular: Normal rate, regular rhythm and intact distal pulses. Pulmonary/Chest: Effort normal and breath sounds normal.   Abdominal: Soft. There is no tenderness. Neurological: He is alert and oriented to person, place, and time. He has normal strength and normal reflexes. No cranial nerve deficit or sensory deficit. GCS eye subscore is 4. GCS verbal subscore is 5. GCS motor subscore is 6. Skin: Skin is warm and dry. Nursing note and vitals reviewed. MDM  Number of Diagnoses or Management Options  Diagnosis management comments: Differential diagnosis includes seizure, stroke, TIA, muscle spasm, electrolyte disturbance    EKG shows normal sinus rhythm at a rate of 86 with left ventricular hypertrophy. Amount and/or Complexity of Data Reviewed  Clinical lab tests: ordered and reviewed  Tests in the radiology section of CPT®: ordered and reviewed  Review and summarize past medical records: yes  Independent visualization of images, tracings, or specimens: yes    Risk of Complications, Morbidity, and/or Mortality  Presenting problems: moderate  Diagnostic procedures: moderate  Management options: moderate    Patient Progress  Patient progress: stable        ED Course   5:26 PM  CAT scan of the head is normal, blood work and EKG are unremarkable. His neurologic exam has remained normal here. I advised him to follow up with his primary care physician or return for any new or worsening symptoms. Voice dictation software was used during the making of this note. This software is not perfect and grammatical and other typographical errors may be present. This note has been proofread, but may still contain errors.   Janina Simon MD; 4/4/2018 @5:26 PM   ===================================================================        Procedures

## 2018-04-04 NOTE — ED TRIAGE NOTES
Pt arrived via ems. Pt states he sat down after shopping this afternoon and his left arm went numb and started to shake. Pt states the numbness went up to the side of his head. Pt states the numbness is gone now. Pt states having a mild headache during this event. Onset was and hour ago. The event lasted 20-30 mins.

## 2018-04-04 NOTE — ED NOTES
I have reviewed discharge instructions with the patient and spouse. The patient and spouse verbalized understanding. Patient left ED via Discharge Method: ambulatory to Home with transport from family. The patient is ambulatory upon exit and appeared in no acute distress. The patient has been provided discharge instructions and follow up information. The patient and wife do not have any questions at this time. Opportunity for questions and clarification provided. Patient given 0 scripts. To continue your aftercare when you leave the hospital, you may receive an automated call from our care team to check in on how you are doing. This is a free service and part of our promise to provide the best care and service to meet your aftercare needs.  If you have questions, or wish to unsubscribe from this service please call [phone]. Thank you for Choosing our [facility] Emergency Department.

## 2018-04-04 NOTE — DISCHARGE INSTRUCTIONS
Numbness and Tingling: Care Instructions  Your Care Instructions    Many things can cause numbness or tingling. Swelling may put pressure on a nerve. This could cause you to lose feeling or have a pins-and-needles sensation on part of your body. Nerves may be damaged from trauma, toxins, or diseases, such as diabetes or multiple sclerosis (MS). Sometimes, though, the cause is not clear. If there is no clear reason for your symptoms, and you are not having any other symptoms, your doctor may suggest watching and waiting for a while to see if the numbness or tingling goes away on its own. Your doctor may want you to have blood or nerve tests to find the cause of your symptoms. Follow-up care is a key part of your treatment and safety. Be sure to make and go to all appointments, and call your doctor if you are having problems. It's also a good idea to know your test results and keep a list of the medicines you take. How can you care for yourself at home? · If your doctor prescribes medicine, take it exactly as directed. Call your doctor if you think you are having a problem with your medicine. · If you have any swelling, put ice or a cold pack on the area for 10 to 20 minutes at a time. Put a thin cloth between the ice and your skin. When should you call for help? Call 911 anytime you think you may need emergency care. For example, call if:  ? · You have weakness, numbness, or tingling in both legs. ? · You lose bowel or bladder control. ? · You have symptoms of a stroke. These may include:  ¨ Sudden numbness, tingling, weakness, or loss of movement in your face, arm, or leg, especially on only one side of your body. ¨ Sudden vision changes. ¨ Sudden trouble speaking. ¨ Sudden confusion or trouble understanding simple statements. ¨ Sudden problems with walking or balance. ¨ A sudden, severe headache that is different from past headaches. ? Watch closely for changes in your health, and be sure to contact your doctor if you have any problems, or if:  ? · You do not get better as expected. Where can you learn more? Go to http://nicholas-davno.info/. Enter S132 in the search box to learn more about \"Numbness and Tingling: Care Instructions. \"  Current as of: October 14, 2016  Content Version: 11.4  © 5269-5844 [employer]. Care instructions adapted under license by [employer] (which disclaims liability or warranty for this information). If you have questions about a medical condition or this instruction, always ask your healthcare professional. [place] [address] any warranty or liability for your use of this information.

## 2019-01-02 PROBLEM — I48.92 ATRIAL FLUTTER (HCC): Status: ACTIVE | Noted: 2019-01-02

## 2019-01-02 PROBLEM — I50.22 SYSTOLIC CHF, CHRONIC (HCC): Status: ACTIVE | Noted: 2019-01-02

## 2019-02-18 PROBLEM — I50.22 SYSTOLIC CHF, CHRONIC (HCC): Status: RESOLVED | Noted: 2019-01-02 | Resolved: 2019-02-18

## 2019-02-18 PROBLEM — I48.92 ATRIAL FLUTTER (HCC): Status: RESOLVED | Noted: 2019-01-02 | Resolved: 2019-02-18

## (undated) DEVICE — DRILL BIT: Brand: XIA 4.5 SYSTEM -  XIA CT

## (undated) DEVICE — KIT POS W/ FOAM ARM CRADL SHEARGUARD CHST PD CVR FOR SPNL

## (undated) DEVICE — SURGIFOAM SPNG SZ 100

## (undated) DEVICE — DRAPE XR C ARM 41X74IN LF --

## (undated) DEVICE — SUTURE VCRL SZ 1 L27IN ABSRB UD L36MM CP-1 1/2 CIR REV CUT J268H

## (undated) DEVICE — Z CONVERTED USE 2421973 CONTAINER SPEC 60/30ML 10% FRMLN POLYPR PREFIL

## (undated) DEVICE — CONTAINER,SPECIMEN,O.R.STRL,4.5OZ: Brand: MEDLINE

## (undated) DEVICE — TRAY CATH 16F DRN BG LTX -- CONVERT TO ITEM 363158

## (undated) DEVICE — SUTURE VCRL + 3-0 L27IN ABSRB UD PS-2 L19MM 3/8 CIR PRIM VCP427H

## (undated) DEVICE — BIPOLAR SEALER 23-112-1 AQM 6.0: Brand: AQUAMANTYS ®

## (undated) DEVICE — KIT EVAC 400CC DIA3/16IN H PAT 12.5IN 3 SPR RND SHP PVC DRN

## (undated) DEVICE — REM POLYHESIVE ADULT PATIENT RETURN ELECTRODE: Brand: VALLEYLAB

## (undated) DEVICE — INTENDED FOR TISSUE SEPARATION, AND OTHER PROCEDURES THAT REQUIRE A SHARP SURGICAL BLADE TO PUNCTURE OR CUT.: Brand: BARD-PARKER SAFETY BLADES SIZE 10, STERILE

## (undated) DEVICE — 2000CC GUARDIAN II: Brand: GUARDIAN

## (undated) DEVICE — DRAPE SHT 3 QTR PROXIMA 53X77 --

## (undated) DEVICE — MASTISOL ADHESIVE LIQ 2/3ML

## (undated) DEVICE — JAM SHIDI: Brand: XIA PRECISION SYSTEM

## (undated) DEVICE — 1010 S-DRAPE TOWEL DRAPE 10/BX: Brand: STERI-DRAPE™

## (undated) DEVICE — (D)SYR 10ML 1/5ML GRAD NSAF -- PKGING CHANGE USE ITEM 338027

## (undated) DEVICE — (D)PREP SKN CHLRAPRP APPL 26ML -- CONVERT TO ITEM 371833

## (undated) DEVICE — KENDALL SCD EXPRESS SLEEVES, KNEE LENGTH, LARGE: Brand: KENDALL SCD

## (undated) DEVICE — AMD ANTIMICROBIAL GAUZE SPONGES,12 PLY USP TYPE VII, 0.2% POLYHEXAMETHYLENE BIGUANIDE HCI (PHMB): Brand: CURITY

## (undated) DEVICE — THE MILL DISPOSABLE - FINE

## (undated) DEVICE — SUT ETHLN 2 30IN LR DA BLK --

## (undated) DEVICE — PACK PROCEDURE SURG POST LAMINECTOMY CDS

## (undated) DEVICE — BLADE ASSEMB CLP HAIR FINE --

## (undated) DEVICE — WAX SURG 2.5GM HEMSTAT BNE BEESWAX PARAFFIN ISO PALMITATE

## (undated) DEVICE — 5.0MM PRECISION ROUND

## (undated) DEVICE — FLOSEAL MATRIX IS INDICATED IN SURGICAL PROCEDURES (OTHER THAN IN OPHTHALMIC) AS AN ADJUNCT TO HEMOSTASIS WHEN CONTROL OF BLEEDING BY LIGATURE OR CONVENTIONALPROCEDURES IS INEFFECTIVE OR IMPRACTICAL.: Brand: FLOSEAL HEMOSTATIC MATRIX

## (undated) DEVICE — SOLUTION IV 1000ML 0.9% SOD CHL

## (undated) DEVICE — SYR BULB 60ML IRRIGATION -- CONVERT TO ITEM 116413

## (undated) DEVICE — INTENDED FOR TISSUE SEPARATION, AND OTHER PROCEDURES THAT REQUIRE A SHARP SURGICAL BLADE TO PUNCTURE OR CUT.: Brand: BARD-PARKER SAFETY BLADES SIZE 15, STERILE

## (undated) DEVICE — SUTURE VCRL SZ 2-0 L27IN ABSRB UD L36MM CP-1 1/2 CIR REV J266H

## (undated) DEVICE — 20 ML SYRINGE LUER-LOCK TIP: Brand: MONOJECT